# Patient Record
Sex: FEMALE | Race: WHITE | NOT HISPANIC OR LATINO | Employment: OTHER | URBAN - METROPOLITAN AREA
[De-identification: names, ages, dates, MRNs, and addresses within clinical notes are randomized per-mention and may not be internally consistent; named-entity substitution may affect disease eponyms.]

---

## 2021-01-13 ENCOUNTER — OFFICE VISIT (OUTPATIENT)
Dept: URGENT CARE | Facility: URGENT CARE | Age: 66
End: 2021-01-13
Payer: COMMERCIAL

## 2021-01-13 ENCOUNTER — ANCILLARY PROCEDURE (OUTPATIENT)
Dept: GENERAL RADIOLOGY | Facility: CLINIC | Age: 66
End: 2021-01-13
Attending: INTERNAL MEDICINE
Payer: COMMERCIAL

## 2021-01-13 VITALS
HEIGHT: 63 IN | RESPIRATION RATE: 14 BRPM | DIASTOLIC BLOOD PRESSURE: 86 MMHG | TEMPERATURE: 98.9 F | OXYGEN SATURATION: 98 % | WEIGHT: 105 LBS | BODY MASS INDEX: 18.61 KG/M2 | SYSTOLIC BLOOD PRESSURE: 140 MMHG | HEART RATE: 69 BPM

## 2021-01-13 DIAGNOSIS — H02.9 LESION OF LEFT LOWER EYELID: Primary | ICD-10-CM

## 2021-01-13 DIAGNOSIS — M54.50 ACUTE BILATERAL LOW BACK PAIN WITHOUT SCIATICA: ICD-10-CM

## 2021-01-13 DIAGNOSIS — M41.9 SCOLIOSIS, UNSPECIFIED SCOLIOSIS TYPE, UNSPECIFIED SPINAL REGION: ICD-10-CM

## 2021-01-13 DIAGNOSIS — R32 URINARY INCONTINENCE, UNSPECIFIED TYPE: ICD-10-CM

## 2021-01-13 DIAGNOSIS — K59.00 CONSTIPATION, UNSPECIFIED CONSTIPATION TYPE: ICD-10-CM

## 2021-01-13 LAB
ALBUMIN UR-MCNC: NEGATIVE MG/DL
APPEARANCE UR: CLEAR
BILIRUB UR QL STRIP: NEGATIVE
COLOR UR AUTO: YELLOW
GLUCOSE UR STRIP-MCNC: NEGATIVE MG/DL
HGB UR QL STRIP: NEGATIVE
KETONES UR STRIP-MCNC: NEGATIVE MG/DL
LEUKOCYTE ESTERASE UR QL STRIP: NEGATIVE
NITRATE UR QL: NEGATIVE
PH UR STRIP: 5 PH (ref 5–7)
SOURCE: NORMAL
SP GR UR STRIP: 1.02 (ref 1–1.03)
UROBILINOGEN UR STRIP-ACNC: 0.2 EU/DL (ref 0.2–1)

## 2021-01-13 PROCEDURE — 99204 OFFICE O/P NEW MOD 45 MIN: CPT | Performed by: INTERNAL MEDICINE

## 2021-01-13 PROCEDURE — 72100 X-RAY EXAM L-S SPINE 2/3 VWS: CPT | Performed by: RADIOLOGY

## 2021-01-13 PROCEDURE — 81003 URINALYSIS AUTO W/O SCOPE: CPT | Performed by: INTERNAL MEDICINE

## 2021-01-13 RX ORDER — POLYETHYLENE GLYCOL 3350 17 G/17G
1 POWDER, FOR SOLUTION ORAL DAILY
Qty: 500 G | Refills: 0 | Status: SHIPPED | OUTPATIENT
Start: 2021-01-13 | End: 2021-01-20

## 2021-01-13 ASSESSMENT — ENCOUNTER SYMPTOMS
HEMATURIA: 0
NAUSEA: 0
ABDOMINAL PAIN: 0
CONSTIPATION: 1
COUGH: 0
VOMITING: 0
SHORTNESS OF BREATH: 0
NUMBNESS: 0
SPEECH DIFFICULTY: 0
HEADACHES: 0
FATIGUE: 0
DYSURIA: 0
WEAKNESS: 0
BACK PAIN: 1
UNEXPECTED WEIGHT CHANGE: 0

## 2021-01-13 ASSESSMENT — MIFFLIN-ST. JEOR: SCORE: 985.41

## 2021-01-13 NOTE — Clinical Note
Hi.  You will be seeing Lore next week in follow-up to establish care.  She has large lower eyelid skin cancer and some other symptoms that she needs to follow-up with

## 2021-01-13 NOTE — PATIENT INSTRUCTIONS
Dermatology appointment [facility] is trying to arrange for biopsy of left lower eyelid skin lesion and from there can direct care for treatment .      X-ray of low back spine shows scoliosis.  No evidence of cancer lesions on x-ray.        consider physical therapy For your low back pain from scoliosis  I would avoid hot heating pads as you have evidence of skin damage from this.    Urine test is normal.  No evidence of infection to cause early morning incontinence.  Further evaluation may be warranted     Start MiraLAX 1 scoop daily in water for constipation symptoms    I would like you to establish primary care   DR Hernandez next week.  Wednesday       Component      Latest Ref Rng & Units 1/13/2021   Color Urine       Yellow   Appearance Urine       Clear   Glucose Urine      NEG:Negative mg/dL Negative   Bilirubin Urine      NEG:Negative Negative   Ketones Urine      NEG:Negative mg/dL Negative   Specific Gravity Urine      1.003 - 1.035 1.025   Blood Urine      NEG:Negative Negative   pH Urine      5.0 - 7.0 pH 5.0   Protein Albumin Urine      NEG:Negative mg/dL Negative   Urobilinogen Urine      0.2 - 1.0 EU/dL 0.2   Nitrite Urine      NEG:Negative Negative   Leukocyte Esterase Urine      NEG:Negative Negative   Source       Midstream Urine

## 2021-01-13 NOTE — PROGRESS NOTES
ASSESSMENT:      ICD-10-CM    1. Lesion of left lower eyelid  H02.9    2. Constipation, unspecified constipation type  K59.00 polyethylene glycol (MIRALAX) 17 GM/Dose powder   3. Urinary incontinence, unspecified type  R32 *UA reflex to Microscopic and Culture ([place] [facility] (except [place] and [place])   4. Acute bilateral low back pain without sciatica  M54.5 XR Lumbar Spine 2/3 Views   5. Scoliosis, unspecified scoliosis type, unspecified spinal region  M41.9         Medical Decision Makin-year-old female without significant medical history nor primary care clinic for the past many years who presents today with concerns of left lower eye lesion that has been growing slowly over a year.  Suspicious for basal cell/squamous cell or other lesion  Patient understands concern for cancer and [facility] dermatology was contacted today to help assist with her care with planned biopsy and decision of treatment plan. And imaging  Also arranging follow-up appointment here at this clinic to establish primary care provider    She also is complaining of some bowel and urinary symptoms with low back pain.  Potential concern also for metastatic disease from cancer  For her back pain, x-ray of low back pain performed to screen for lytic lesions that could cause back pain.  She also had some urinary incontinence and difficulty with constipation.  Neuro exam of lower extremities and perineum seemed intact.  Urinalysis also performed to look for urine infection  And will initiate treatment of constipation with MiraLAX    PLAN:    Dermatology [facility] contacted. currently they do not have appointments available and will contact her soon to get her in for evaluation and treatment of concerning lower eyelid lesion/cancer    [id number]}    Followup:    Dermatology -appointment to be scheduled  Dr Hernandez -next week with establish care    Patient Instructions     Dermatology appointment  [facility] is trying to arrange for biopsy of left lower eyelid skin lesion and from there can direct care for treatment .      X-ray of low back spine shows scoliosis.  No evidence of cancer lesions on x-ray.        consider physical therapy For your low back pain from scoliosis  I would avoid hot heating pads as you have evidence of skin damage from this.    Urine test is normal.  No evidence of infection to cause early morning incontinence.  Further evaluation may be warranted     Start MiraLAX 1 scoop daily in water for constipation symptoms    I would like you to establish primary care   DR Hernandez next week.  Wednesday       Component      Latest Ref Rng & Units 1/13/2021   Color Urine       Yellow   Appearance Urine       Clear   Glucose Urine      NEG:Negative mg/dL Negative   Bilirubin Urine      NEG:Negative Negative   Ketones Urine      NEG:Negative mg/dL Negative   Specific Gravity Urine      1.003 - 1.035 1.025   Blood Urine      NEG:Negative Negative   pH Urine      5.0 - 7.0 pH 5.0   Protein Albumin Urine      NEG:Negative mg/dL Negative   Urobilinogen Urine      0.2 - 1.0 EU/dL 0.2   Nitrite Urine      NEG:Negative Negative   Leukocyte Esterase Urine      NEG:Negative Negative   Source       Midstream Urine               SUBJECTIVE:   Lore Oleary is a 65 year old female presenting with a chief complaint of   Chief Complaint   Patient presents with     Urgent Care     Back Pain     neck and back hurt this week.      Derm Problem     sore under left eye for 6 months or so.          She is a new patient of [facility].    Onset of symptoms was at least 1 year ago.  Course of illness is worsening.      Location: left lower eye lid  Context: developed sore  Started as a spot, then worse  Current and Associated symptoms: itching, redness, warmth, swelling, pain and drainage - develops scab  Treatment measures tried include: peroxide  Relief from treatment: none, lotions  Significant past  "medical history:       Onset of symptoms was 1 week(s) ago.  Location: bilateral middle low back pain  Context:      No  Injury    Current and Associated symptoms:   Has constipation  Urinary incontinence in the morning.  Therapies to improve symptoms include: heat      Review of Systems   Constitutional: Negative for fatigue and unexpected weight change.   HENT: Negative.    Respiratory: Negative for cough and shortness of breath.    Cardiovascular: Negative for chest pain.   Gastrointestinal: Positive for constipation (BM every few days.  firm). Negative for abdominal pain, nausea and vomiting.   Genitourinary: Negative for dysuria and hematuria.   Musculoskeletal: Positive for back pain.   Neurological: Negative for speech difficulty, weakness, numbness and headaches.       Past Medical History:   Diagnosis Date     Patient denies medical problems      Family History   Problem Relation Age of Onset     Heart Disease Father      Diabetes Sister      Current Outpatient Medications   Medication Sig Dispense Refill     polyethylene glycol (MIRALAX) 17 GM/Dose powder Take 17 g (1 capful) by mouth daily 500 g 0     Social History     Tobacco Use     Smoking status: Never Smoker     Smokeless tobacco: Never Used   Substance Use Topics     Alcohol use: Not on file       OBJECTIVE  BP (!) 140/86   Pulse 69   Temp 98.9  F (37.2  C) (Oral)   Resp 14   Ht 1.6 m (5' 3\")   Wt 47.6 kg (105 lb)   SpO2 98%   BMI 18.60 kg/m      Physical Exam  Vitals signs reviewed.   Constitutional:       General: She is not in acute distress.     Appearance: Normal appearance. She is not ill-appearing.   Eyes:      Comments: left lower eye lid lesion -   3 x 1.5 cm ulcerative lesion     Cardiovascular:      Rate and Rhythm: Normal rate and regular rhythm.      Pulses: Normal pulses.      Heart sounds: Normal heart sounds.   Pulmonary:      Effort: Pulmonary effort is normal.      Breath sounds: Normal breath sounds.   Abdominal:      " Palpations: Abdomen is soft.      Tenderness: There is no abdominal tenderness.   Genitourinary:     Comments: Good anal strength  Musculoskeletal:      Right lower leg: No edema.      Left lower leg: No edema.      Comments: BACK: Lumbosacral spine area reveals local tenderness. no Painful and reduced LS ROM noted. Straight leg raise is negative  at bilateral  . motor strength and sensation normal, including heel and toe gait are normal       No abdominal tenderness, mass or organomegaly.     Skin:     Findings: No rash.      Comments: Postinflammatory hyperpigmented of  lower back where patient had hot heating pads   Neurological:      General: No focal deficit present.      Mental Status: She is alert.      Sensory: No sensory deficit.      Motor: No weakness.      Comments: normal strength lower extremities  normal perianal sensation to broken tongue depressor   Psychiatric:         Mood and Affect: Mood normal.               Labs:  Results for orders placed or performed in visit on 01/13/21 (from the past 24 hour(s))   *UA reflex to Microscopic and Culture ([place] [facility] (except [place] and [place])    Specimen: Midstream Urine   Result Value Ref Range    Color Urine Yellow     Appearance Urine Clear     Glucose Urine Negative NEG^Negative mg/dL    Bilirubin Urine Negative NEG^Negative    Ketones Urine Negative NEG^Negative mg/dL    Specific Gravity Urine 1.025 1.003 - 1.035    Blood Urine Negative NEG^Negative    pH Urine 5.0 5.0 - 7.0 pH    Protein Albumin Urine Negative NEG^Negative mg/dL    Urobilinogen Urine 0.2 0.2 - 1.0 EU/dL    Nitrite Urine Negative NEG^Negative    Leukocyte Esterase Urine Negative NEG^Negative    Source Midstream Urine        X-Ray was done, my findings are: Scoliosis noted of lumbar spine.  Otherwise no compression fractures or lytic lesions noted.      I spent over 60  minutes with care of  Lore Oleary during today's office visit.  Visit was competed by 3  pm

## 2021-01-17 ENCOUNTER — DOCUMENTATION ONLY (OUTPATIENT)
Dept: CARE COORDINATION | Facility: CLINIC | Age: 66
End: 2021-01-17

## 2021-01-20 ENCOUNTER — OFFICE VISIT (OUTPATIENT)
Dept: FAMILY MEDICINE | Facility: CLINIC | Age: 66
End: 2021-01-20
Payer: COMMERCIAL

## 2021-01-20 VITALS
OXYGEN SATURATION: 98 % | DIASTOLIC BLOOD PRESSURE: 76 MMHG | TEMPERATURE: 97.4 F | WEIGHT: 105 LBS | SYSTOLIC BLOOD PRESSURE: 122 MMHG | HEART RATE: 77 BPM | BODY MASS INDEX: 18.6 KG/M2

## 2021-01-20 DIAGNOSIS — Z13.220 SCREENING FOR LIPID DISORDERS: ICD-10-CM

## 2021-01-20 DIAGNOSIS — E83.52 HYPERCALCEMIA: ICD-10-CM

## 2021-01-20 DIAGNOSIS — L98.9 SKIN LESION: Primary | ICD-10-CM

## 2021-01-20 DIAGNOSIS — Z23 NEED FOR PROPHYLACTIC VACCINATION AND INOCULATION AGAINST INFLUENZA: ICD-10-CM

## 2021-01-20 DIAGNOSIS — Z13.1 SCREENING FOR DIABETES MELLITUS: ICD-10-CM

## 2021-01-20 DIAGNOSIS — Z11.59 ENCOUNTER FOR HEPATITIS C SCREENING TEST FOR LOW RISK PATIENT: ICD-10-CM

## 2021-01-20 DIAGNOSIS — R53.83 FATIGUE, UNSPECIFIED TYPE: ICD-10-CM

## 2021-01-20 LAB
ALBUMIN SERPL-MCNC: 4 G/DL (ref 3.4–5)
ALP SERPL-CCNC: 54 U/L (ref 40–150)
ALT SERPL W P-5'-P-CCNC: 23 U/L (ref 0–50)
ANION GAP SERPL CALCULATED.3IONS-SCNC: 7 MMOL/L (ref 3–14)
AST SERPL W P-5'-P-CCNC: 19 U/L (ref 0–45)
BILIRUB SERPL-MCNC: 0.3 MG/DL (ref 0.2–1.3)
BUN SERPL-MCNC: 18 MG/DL (ref 7–30)
CALCIUM SERPL-MCNC: 11.9 MG/DL (ref 8.5–10.1)
CHLORIDE SERPL-SCNC: 108 MMOL/L (ref 94–109)
CHOLEST SERPL-MCNC: 283 MG/DL
CO2 SERPL-SCNC: 25 MMOL/L (ref 20–32)
CREAT SERPL-MCNC: 0.69 MG/DL (ref 0.52–1.04)
ERYTHROCYTE [DISTWIDTH] IN BLOOD BY AUTOMATED COUNT: 12 % (ref 10–15)
GFR SERPL CREATININE-BSD FRML MDRD: >90 ML/MIN/{1.73_M2}
GLUCOSE SERPL-MCNC: 96 MG/DL (ref 70–99)
HCT VFR BLD AUTO: 38.4 % (ref 35–47)
HDLC SERPL-MCNC: 100 MG/DL
HGB BLD-MCNC: 12.6 G/DL (ref 11.7–15.7)
LDLC SERPL CALC-MCNC: 168 MG/DL
MCH RBC QN AUTO: 32.3 PG (ref 26.5–33)
MCHC RBC AUTO-ENTMCNC: 32.8 G/DL (ref 31.5–36.5)
MCV RBC AUTO: 99 FL (ref 78–100)
NONHDLC SERPL-MCNC: 183 MG/DL
PLATELET # BLD AUTO: 341 10E9/L (ref 150–450)
POTASSIUM SERPL-SCNC: 4.4 MMOL/L (ref 3.4–5.3)
PROT SERPL-MCNC: 8 G/DL (ref 6.8–8.8)
RBC # BLD AUTO: 3.9 10E12/L (ref 3.8–5.2)
SODIUM SERPL-SCNC: 140 MMOL/L (ref 133–144)
TRIGL SERPL-MCNC: 76 MG/DL
WBC # BLD AUTO: 4.6 10E9/L (ref 4–11)

## 2021-01-20 PROCEDURE — G0009 ADMIN PNEUMOCOCCAL VACCINE: HCPCS | Performed by: STUDENT IN AN ORGANIZED HEALTH CARE EDUCATION/TRAINING PROGRAM

## 2021-01-20 PROCEDURE — 80053 COMPREHEN METABOLIC PANEL: CPT | Performed by: STUDENT IN AN ORGANIZED HEALTH CARE EDUCATION/TRAINING PROGRAM

## 2021-01-20 PROCEDURE — 80061 LIPID PANEL: CPT | Performed by: STUDENT IN AN ORGANIZED HEALTH CARE EDUCATION/TRAINING PROGRAM

## 2021-01-20 PROCEDURE — 99214 OFFICE O/P EST MOD 30 MIN: CPT | Mod: 25 | Performed by: STUDENT IN AN ORGANIZED HEALTH CARE EDUCATION/TRAINING PROGRAM

## 2021-01-20 PROCEDURE — G0008 ADMIN INFLUENZA VIRUS VAC: HCPCS | Performed by: STUDENT IN AN ORGANIZED HEALTH CARE EDUCATION/TRAINING PROGRAM

## 2021-01-20 PROCEDURE — 90662 IIV NO PRSV INCREASED AG IM: CPT | Performed by: STUDENT IN AN ORGANIZED HEALTH CARE EDUCATION/TRAINING PROGRAM

## 2021-01-20 PROCEDURE — 36415 COLL VENOUS BLD VENIPUNCTURE: CPT | Performed by: STUDENT IN AN ORGANIZED HEALTH CARE EDUCATION/TRAINING PROGRAM

## 2021-01-20 PROCEDURE — 85027 COMPLETE CBC AUTOMATED: CPT | Performed by: STUDENT IN AN ORGANIZED HEALTH CARE EDUCATION/TRAINING PROGRAM

## 2021-01-20 PROCEDURE — 90732 PPSV23 VACC 2 YRS+ SUBQ/IM: CPT | Performed by: STUDENT IN AN ORGANIZED HEALTH CARE EDUCATION/TRAINING PROGRAM

## 2021-01-20 ASSESSMENT — PATIENT HEALTH QUESTIONNAIRE - PHQ9: SUM OF ALL RESPONSES TO PHQ QUESTIONS 1-9: 16

## 2021-01-20 NOTE — LETTER
January 21, 2021      Lore Arun Mamta  [address]  [facility] [address]        Dear ,    We are writing to inform you of your test results.    Calcium is high as we have discussed on the phone. Please follow up as directed.    Cholesterol is also high. I recommend dietary changes and regular exercise (at least 150 minutes per week of aerobic and strength training). See dietary recommendations below:    Foods to eat and what to avoid/minimize when you have diabetes or heart disease (or want to prevent it):      Foods to avoid:  -Processed foods (eg. frozen meals, pre packaged foods)  -Sucrose and fructose (check the ingredients)  -Refined carbohydrates (eg. white flour bread and crackers, pasta)  -Fast food  -Too much animal fat (red meat, excessive butter or dairy)  -Food or drink in plastic containers  -Soda, fruit juice, sweetened beverages  -[place] large meals  -High sugar fruits (eg. dimitry, pineapple, watermelon, grapes, cherries, bananas)    Foods to eat:  -Unlimited colorful vegetables  -Unlimited leafy greens  -Polyunsaturated and monounsaturated fats (see below)  -Olive oil  -Lean meat (turkey, chicken)  -Fish and seafood (eg. Sardines, salmon, mackerel)  -Whole grains (eg. Quinoa, oatmeal, buckwheat, brown rice, barley, rye, spelt)  -Avocados  -Unsalted nuts and seeds, raw when possible  -Eggs in moderation  -Berries, audi, limes  -Cinnamon   -Drink water, herbal tea, green tea    Healthiest sources of fats: olives, olive oil, avocado and avocado oil, canola oil, almonds, cashews, hazelnuts, macadamia nuts, peanuts, pecans, pistachios, flax seeds...    Tips: Cook with avocado oil. Add olive oil AFTER you cook foods to preserve the nutrients. When cooking a meal, make extra to eat the next day. Plan your meals ahead of time.       Resulted Orders   Lipid panel reflex to direct LDL Fasting   Result Value Ref Range    Cholesterol 283 (H) <200 mg/dL      Comment:      Desirable:       <200  mg/dl    Triglycerides 76 <150 mg/dL      Comment:      Fasting specimen    HDL Cholesterol 100 >49 mg/dL    LDL Cholesterol Calculated 168 (H) <100 mg/dL      Comment:      Above desirable:  100-129 mg/dl  Borderline High:  130-159 mg/dL  High:             160-189 mg/dL  Very high:       >189 mg/dl      Non HDL Cholesterol 183 (H) <130 mg/dL      Comment:      Above Desirable:  130-159 mg/dl  Borderline high:  160-189 mg/dl  High:             190-219 mg/dl  Very high:       >219 mg/dl     Comprehensive metabolic panel (BMP + Alb, Alk Phos, ALT, AST, Total. Bili, TP)   Result Value Ref Range    Sodium 140 133 - 144 mmol/L    Potassium 4.4 3.4 - 5.3 mmol/L    Chloride 108 94 - 109 mmol/L    Carbon Dioxide 25 20 - 32 mmol/L    Anion Gap 7 3 - 14 mmol/L    Glucose 96 70 - 99 mg/dL      Comment:      Fasting specimen    Urea Nitrogen 18 7 - 30 mg/dL    Creatinine 0.69 0.52 - 1.04 mg/dL    GFR Estimate >90 >60 mL/min/[1.73_m2]      Comment:      Non  GFR Calc  Starting 12/18/2018, serum creatinine based estimated GFR (eGFR) will be   calculated using the Chronic Kidney Disease Epidemiology Collaboration   (CKD-EPI) equation.      GFR Estimate If Black >90 >60 mL/min/[1.73_m2]      Comment:       GFR Calc  Starting 12/18/2018, serum creatinine based estimated GFR (eGFR) will be   calculated using the Chronic Kidney Disease Epidemiology Collaboration   (CKD-EPI) equation.      Calcium 11.9 (H) 8.5 - 10.1 mg/dL    Bilirubin Total 0.3 0.2 - 1.3 mg/dL    Albumin 4.0 3.4 - 5.0 g/dL    Protein Total 8.0 6.8 - 8.8 g/dL    Alkaline Phosphatase 54 40 - 150 U/L    ALT 23 0 - 50 U/L    AST 19 0 - 45 U/L   CBC with platelets   Result Value Ref Range    WBC 4.6 4.0 - 11.0 10e9/L    RBC Count 3.90 3.8 - 5.2 10e12/L    Hemoglobin 12.6 11.7 - 15.7 g/dL    Hematocrit 38.4 35.0 - 47.0 %    MCV 99 78 - 100 fl    MCH 32.3 26.5 - 33.0 pg    MCHC 32.8 31.5 - 36.5 g/dL    RDW 12.0 10.0 - 15.0 %    Platelet Count  341 419 - 578 10e9/L       If you have any questions or concerns, please call the clinic at the number listed above.       Sincerely,      Yenni Hernandez MD

## 2021-01-20 NOTE — PROGRESS NOTES
Assessment & Plan     1. Skin lesion  Follow up scheduled with Dermatology this Friday. Suspect BCC or squamous cell cancer.     2. Encounter for hepatitis C screening test for low risk patient  Due for screening.  - **Hepatitis C Screen Reflex to RNA FUTURE anytime; Future    3. Screening for lipid disorders  Has not been screened previously  - Lipid panel reflex to direct LDL Fasting    4. Screening for diabetes mellitus  Has not been screened.  - Comprehensive metabolic panel (BMP + Alb, Alk Phos, ALT, AST, Total. Bili, TP)    5. Fatigue, unspecified type  Notes feeling a little down due to COVID, isolation from friends, skin ulceration but declines any additional assistance today.  - Comprehensive metabolic panel (BMP + Alb, Alk Phos, ALT, AST, Total. Bili, TP)  - CBC with platelets    6. Need for prophylactic vaccination and inoculation against influenza  Due for vaccine.   - FLUZONE HIGH DOSE 65+  [73156]    30 minutes spent on the date of the encounter doing chart review, history and exam, documentation and further activities as noted above         Depression Screening Follow Up    PHQ 1/20/2021   PHQ-9 Total Score 16   Q9: Thoughts of better off dead/self-harm past 2 weeks Not at all     Follow Up Actions Taken  Patient counseled, no additional follow up at this time.     Follow up: See patient instructions.    Yenni Hernandez MD  [facility]    Grey Torrez is a 65 year old who presents to clinic today for the following health issues     HPI     She is here to follow up after recent  visit for eye lesion:    Eye lesion - present for about a year. Put off seeking care due to lack of medical insurance. Hasn't noted any change in lesion since her  visit. Denies fever, chill, night sweats, weight loss, redness, drainage. She has some pain at night that makes it difficult to sleep. Has an appointment with Dermatology on Friday. Her sister will be taking her.    She had  been having urinary incontinence, constipation at her  visit. These symptoms have improved and no longer bother her. She believes she was nervous at the time. She had also had back pain that has now resolved. Denies any new concerns today.    She has not had a PCP or physical. She is open to lab testing today. She will follow up in a month or so  for a physical and elects to defer routine screens until then.     She grew up in [place]. She lives with a roommate. They get along OK. She is not working currently. 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 She enjoys going for walks. She is a little less energetic and a little down as of late due to COVID, winter time and isolation. She denies any significant concern about this or any need for therapy, medication. She feels she is managing fine.     Objective    /76 (BP Location: Left arm, Patient Position: Sitting, Cuff Size: Adult Regular)   Pulse 77   Temp 97.4  F (36.3  C) (Oral)   Wt 47.6 kg (105 lb)   SpO2 98%   BMI 18.60 kg/m    Body mass index is 18.6 kg/m .  Physical Exam   GENERAL: healthy, alert and no distress  EYES: Eyes grossly normal to inspection, PERRL and conjunctivae and sclerae normal  HENT: ear canals and TM's normal, nose and mouth without ulcers or lesions, large ulcerated lesion inferior to her left eye covered with bandaid (see  note from 1/3/2021 for photo)  RESP: lungs clear to auscultation - no rales, rhonchi or wheezes  CV: regular rate and rhythm, normal S1 S2, no S3 or S4, no murmur, click or rub, no peripheral edema and peripheral pulses strong  MS: no gross musculoskeletal defects noted, no edema  NEURO: Normal strength and tone, mentation intact and speech normal  PSYCH: mentation appears normal, affect normal    Labs pending.

## 2021-01-20 NOTE — PATIENT INSTRUCTIONS
[facility]    Address   [address] [place] [address]     Labs - if anything abnormal, we will call you. Otherwise expect a letter in the mail.    Vitamin D - Recommend 2000 international unit(s) per day.     Follow up for a physical in 1-2 months when you are ready.

## 2021-01-21 ENCOUNTER — TELEPHONE (OUTPATIENT)
Dept: FAMILY MEDICINE | Facility: CLINIC | Age: 66
End: 2021-01-21

## 2021-01-21 PROBLEM — R79.89 HIGH SERUM LOW-DENSITY LIPOPROTEIN (LDL): Status: ACTIVE | Noted: 2021-01-21

## 2021-01-21 PROBLEM — E83.52 HIGH CALCIUM LEVELS: Status: ACTIVE | Noted: 2021-01-21

## 2021-01-21 NOTE — RESULT ENCOUNTER NOTE
Please call patient:    Her calcium level is high. I would like to obtain repeat labs to make sure that this is not a lab error. High calcium should be looked into further because it can be a sign of thyroid disease or another condition. We can either recheck her labs if she would schedule a physical in 2 weeks or she can come in for a lab only appointment, which can be done within 1-2 weeks.     Additionally, cholesterol is high. I will send her a results letter with further instructions regarding this (lifestyle changes).    Thanks. Will place lab orders.    Yenni Hernandez MD

## 2021-01-21 NOTE — TELEPHONE ENCOUNTER
----- Message from Yenni Hernandez MD sent at 1/21/2021  7:26 AM CST -----  Please call patient:    Her calcium level is high. I would like to obtain repeat labs to make sure that this is not a lab error. High calcium should be looked into further because it can be a sign of thyroid disease or another condition. We can either recheck her labs if she would schedule a physical in 2 weeks or she can come in for a lab only appointment, which can be done within 1-2 weeks.     Additionally, cholesterol is high. I will send her a results letter with further instructions regarding this (lifestyle changes).    Thanks. Will place lab orders.    Yenni Hernandez MD

## 2021-01-21 NOTE — TELEPHONE ENCOUNTER
Writer called patient and reviewed message per Dr. Hernandez.    Patient verbalized understanding and in agreement with plan.    Patient stated she would prefer lab-only visit.    Lab appt scheduled on 1/31/21.  Appt date, time and location confirmed with patient.  Patient informed she does not need to fast for these labs.    DALILA CookN, RN  [facility]

## 2021-01-22 ENCOUNTER — OFFICE VISIT (OUTPATIENT)
Dept: DERMATOLOGY | Facility: CLINIC | Age: 66
End: 2021-01-22
Payer: COMMERCIAL

## 2021-01-22 DIAGNOSIS — D48.9 NEOPLASM OF UNCERTAIN BEHAVIOR: Primary | ICD-10-CM

## 2021-01-22 PROCEDURE — 11102 TANGNTL BX SKIN SINGLE LES: CPT | Mod: GC | Performed by: DERMATOLOGY

## 2021-01-22 PROCEDURE — 88305 TISSUE EXAM BY PATHOLOGIST: CPT | Performed by: DERMATOLOGY

## 2021-01-22 PROCEDURE — 99203 OFFICE O/P NEW LOW 30 MIN: CPT | Mod: 25 | Performed by: DERMATOLOGY

## 2021-01-22 RX ORDER — LIDOCAINE HYDROCHLORIDE AND EPINEPHRINE 10; 10 MG/ML; UG/ML
3 INJECTION, SOLUTION INFILTRATION; PERINEURAL ONCE
Status: ACTIVE | OUTPATIENT
Start: 2021-01-22

## 2021-01-22 ASSESSMENT — PAIN SCALES - GENERAL
PAINLEVEL: MODERATE PAIN (5)
PAINLEVEL: NO PAIN (0)

## 2021-01-22 NOTE — LETTER
1/22/2021       RE: Lore Oleary  [address]  [facility] [address]     Dear Colleague,    Thank you for referring your patient, Lore Oleary, to the [facility] [place] at [facility]. Please see a copy of my visit note below.    [facility] Note  Encounter Date: Jan 22, 2021  Office Visit     Dermatology Problem List:  1. NUB   - L lower eyelid/upper cheek with possible intraorbital extension, r/o BCC s/p bx 1/22/21 results pending   - Mohs referral pending for further evaluation of imaging to r/o intraortibal invovlement    Social history: Lives with a roommate in [facility].  Family history: Negative for skin cancer.  ____________________________________________    Assessment & Plan:     1. NUB   - Pink pearly plaque involving nearly the entire L lower eyelid extending to the upper cheek with obliteration of lower lashes, r/o BCC s/p bx 1/22/21 results pending   EOM grossly intact today w/out evidence of LAD. However, given extensive involvement of lower lid, will need to further evaluate need for MRI of the orbit to r/o EOM invasion.   - Mohs referral pending for further evaluation of imaging to r/o ocular muscle invovlement  - Discussed in detail with patient today our concerns. Explained this is almost certainly a BCC. While these rarely result in death, they can be locally advanced. Discussed my concerns about involvement of the orbit. Explained plan to biopsy today to confirm diagnosis and then get in with one of our mohs surgeons for evaluation and consideration of imaging to help aid in decision of surgical approach vs vismodegib.    Procedures Performed:   - Shave biopsy procedure note, location L lower eyelid/upper cheek: After discussion of benefits and risks including but not limited to bleeding, infection, scar, incomplete removal, recurrence, and non-diagnostic biopsy, written  consent and photographs were obtained. The area was cleaned with isopropyl alcohol. 0.5mL of 1% lidocaine with epinephrine was injected to obtain adequate anesthesia of  lesion(s) . Snip biopsy at site(s) performed. Hemostasis was achieved with cautery. Petrolatum ointment and a sterile dressing were applied. The patient tolerated the procedure and no complications were noted. The patient was provided with verbal and written post care instructions.     Follow-up: with Dr. De La Rosa for consult.     Staff and Resident:     Smita Rios MD/MPH  Internal Medicine/Dermatology  PGY-4    Staff Physician Comments:   I saw and evaluated the patient with the resident and I agree with the assessment and plan. I was present for the key portions of the above major procedure and examination.    Anne Santa MD    Department of Dermatology  [facility]: Phone: [phone], Fax:[phone]  [facility]: Phone: [phone], Fax: [phone]      ____________________________________________    CC: Derm Problem (Patient concern regarding redness and swelling to lower left eyelid. )    HPI:  Ms. Lore Oleary is a(n) 65 year old female who presents today as a new patient for a lesion on the L lowerlid and upper cheek. It's been there for a year and is getting worse and growing and is now painful. No personal hx of skin cancer. No family of skin cancer that she knows of. She reports her own health could be better. She hasn't had health insurance for many years and just recently got Medicare this Fall and has established care with a primary doctor recently.       Patient is otherwise feeling well, without additional concerns.    Labs:  None.     Physical Exam:  Vitals: There were no vitals taken for this visit.  SKIN: Focused examination of face was performed.  - Pink pearly plaque involving nearly the  entire thickness of L lower eyelid (cutaneous to mucosa) extending to the upper cheek with obliteration of lower lashes  - Extraocular muscle movement grossly intact   - No cervical, submandibular, occipital or pre/post auricular lymphadenopathy.   - No other lesions of concern on areas examined.     Medications:  No current outpatient medications on file.     Current Facility-Administered Medications   Medication     lidocaine 1% with EPINEPHrine 1:100,000 injection 3 mL      Past Medical History:   Patient Active Problem List   Diagnosis     High calcium levels     High serum low-density lipoprotein (LDL)     Past Medical History:   Diagnosis Date     Patient denies medical problems      [facility]  [address] [place] [address] on close of this encounter.

## 2021-01-22 NOTE — NURSING NOTE
Dermatology Rooming Note    Lore Oleary's goals for this visit include:   Chief Complaint   Patient presents with     Derm Problem     Patient concern regarding redness and swelling to lower left eyelid.      Bacilio Lopez, EMT

## 2021-01-22 NOTE — PATIENT INSTRUCTIONS

## 2021-01-22 NOTE — PROGRESS NOTES
[facility] Dermatology Note  Encounter Date: Jan 22, 2021  Office Visit     Dermatology Problem List:  1. NUB   - L lower eyelid/upper cheek with possible intraorbital extension, r/o BCC s/p bx 1/22/21 results pending   - Mohs referral pending for further evaluation of imaging to r/o intraortibal invovlement    Social history: Lives with a roommate in [facility].  Family history: Negative for skin cancer.  ____________________________________________    Assessment & Plan:     1. NUB   - Pink pearly plaque involving nearly the entire L lower eyelid extending to the upper cheek with obliteration of lower lashes, r/o BCC s/p bx 1/22/21 results pending   EOM grossly intact today w/out evidence of LAD. However, given extensive involvement of lower lid, will need to further evaluate need for MRI of the orbit to r/o EOM invasion.   - Mohs referral pending for further evaluation of imaging to r/o ocular muscle invovlement  - Discussed in detail with patient today our concerns. Explained this is almost certainly a BCC. While these rarely result in death, they can be locally advanced. Discussed my concerns about involvement of the orbit. Explained plan to biopsy today to confirm diagnosis and then get in with one of our mohs surgeons for evaluation and consideration of imaging to help aid in decision of surgical approach vs vismodegib.    Procedures Performed:   - Shave biopsy procedure note, location L lower eyelid/upper cheek: After discussion of benefits and risks including but not limited to bleeding, infection, scar, incomplete removal, recurrence, and non-diagnostic biopsy, written consent and photographs were obtained. The area was cleaned with isopropyl alcohol. 0.5mL of 1% lidocaine with epinephrine was injected to obtain adequate anesthesia of  lesion(s) . Snip biopsy at site(s) performed. Hemostasis was achieved with cautery. Petrolatum ointment and a sterile dressing were applied. The  patient tolerated the procedure and no complications were noted. The patient was provided with verbal and written post care instructions.     Follow-up: with Dr. De La Rosa for consult.     Staff and Resident:     Smita Rios MD/MPH  Internal Medicine/Dermatology  PGY-4    Staff Physician Comments:   I saw and evaluated the patient with the resident and I agree with the assessment and plan. I was present for the key portions of the above major procedure and examination.    Anne Santa MD    Department of Dermatology  [facility]: Phone: [phone], Fax:[phone]  [facility]: Phone: [phone], Fax: [phone]      ____________________________________________    CC: Derm Problem (Patient concern regarding redness and swelling to lower left eyelid. )    HPI:  Ms. Lore Oleary is a(n) 65 year old female who presents today as a new patient for a lesion on the L lowerlid and upper cheek. It's been there for a year and is getting worse and growing and is now painful. No personal hx of skin cancer. No family of skin cancer that she knows of. She reports her own health could be better. She hasn't had health insurance for many years and just recently got Medicare this Fall and has established care with a primary doctor recently.       Patient is otherwise feeling well, without additional concerns.    Labs:  None.     Physical Exam:  Vitals: There were no vitals taken for this visit.  SKIN: Focused examination of face was performed.  - Pink pearly plaque involving nearly the entire thickness of L lower eyelid (cutaneous to mucosa) extending to the upper cheek with obliteration of lower lashes  - Extraocular muscle movement grossly intact   - No cervical, submandibular, occipital or pre/post auricular lymphadenopathy.   - No other lesions of concern on areas examined.      Medications:  No current outpatient medications on file.     Current Facility-Administered Medications   Medication     lidocaine 1% with EPINEPHrine 1:100,000 injection 3 mL      Past Medical History:   Patient Active Problem List   Diagnosis     High calcium levels     High serum low-density lipoprotein (LDL)     Past Medical History:   Diagnosis Date     Patient denies medical problems      [facility]  [address] [place] [address] on close of this encounter.

## 2021-01-22 NOTE — NURSING NOTE
Lidocaine-epinephrine 1-1:791564 % injection   0.5mL once for one use, starting 1/22/2021 ending 1/22/2021,  2mL disp, R-0, injection  Injected by Dr. Damon

## 2021-01-25 LAB — COPATH REPORT: NORMAL

## 2021-01-25 NOTE — TELEPHONE ENCOUNTER
FUTURE VISIT INFORMATION      FUTURE VISIT INFORMATION:    Date: 1.27.21    Time: 1:00    Location: [facility]  REFERRAL INFORMATION:    Referring provider:  Dr. Anne Santa    Referring providers clinic:  [facility]    Reason for visit/diagnosis  in person consult per Dr. Santa, L lower eyelid, pending path from 1/22 appt    RECORDS REQUESTED FROM:       Clinic name Comments Records Status Photos Status   [facility] 1.22.21  Path #  (pending) [facility] 1.20.21  Dr. Yenni Hernandez    1.13.21  Dr. Lara Rueda [facility]

## 2021-01-27 ENCOUNTER — PRE VISIT (OUTPATIENT)
Dept: DERMATOLOGY | Facility: CLINIC | Age: 66
End: 2021-01-27

## 2021-01-27 ENCOUNTER — OFFICE VISIT (OUTPATIENT)
Dept: DERMATOLOGY | Facility: CLINIC | Age: 66
End: 2021-01-27
Payer: COMMERCIAL

## 2021-01-27 DIAGNOSIS — C44.1192 BASAL CELL CARCINOMA (BCC) OF LEFT LOWER EYELID: Primary | ICD-10-CM

## 2021-01-27 PROCEDURE — 99214 OFFICE O/P EST MOD 30 MIN: CPT | Mod: 57 | Performed by: DERMATOLOGY

## 2021-01-27 ASSESSMENT — PAIN SCALES - GENERAL: PAINLEVEL: MODERATE PAIN (5)

## 2021-01-27 NOTE — NURSING NOTE
Chief Complaint   Patient presents with     Derm Problem     patient is here today for a consult BCC left lower eyelid.      Jossy CRUZ CMA

## 2021-01-27 NOTE — LETTER
1/27/2021       RE: Lore Oleary  [address]  [facility] [address]     Dear Colleague,    Thank you for referring your patient, Lore Oleary, to the [facility]ERMAT[facility] [place] at [facility]. Please see a copy of my visit note below.    Dermatology Surgery Note    Dermatology Surgery Clinic  [facility]  [address] [place] [address]    Dermatology Problem List:  (1) BCC, Left lower eyelid, s/p biopsy 1/22/2021     Subjective: Ms. Oleary is a pleasant 65 year old woman with history of basal cell carcinoma of the entire lower eyelid who presents today for surgical consultation. The patient reports the tumor has been growing for over 1 year. She denies any changes in vision, but does report some irritation to the globe of the eye and tenderness in the area. She has not had a skin cancer in the past and is not familiar with Mohs surgery.     The patient denies any other joyce conditions and takes no medications. She denies joint replacement, artificial valves or an implantable device. She has no known allergies.     Overall, the patient is feeling well today    Objective: An exam of the face was performed today   Cranial Nerve exam: the eyes move in all directions equally, pupils are even bilaterally  Skin: Left lower eyelid with large 3 x 2 cm friable, waxy, mobile nodule. The tumor was able to be retracted from the globe and inverted with manipulation.     Assessment and Plan:   (1) Basal cell carcinoma of the left lower eye lid  - Discussed the nature of the diagnosis/condition  - Discussed the treatment options, including the risks benefits and expectations of these options.   - Recommended micrographic surgery, patient agrees to proceed with scheduling  - Plan for cheek advancement flap / island pedicle flap closure for repair of the anterior lamella fo the lower  eyelid and cheek in combination with a Sierra flap to repair posterior lamella.     Patient was discussed with and evaluated by attending physician Dr. Thomas Lara MD  PGY-6    Micrographic Surgery and Dermatologic Oncology Fellow  January 27, 2021                Attestation signed by Thomas De La Rosa MD at 2/4/2021  8:35 AM:    Attending Attestation:  I personally interviewed and examined the patient.  The patient was discussed with the resident.  I reviewed the resident note and agree with the findings.  Any edits are below.    History: BCC On lower eyelid    PE: 3 cm ulcerated tumor.  Mobile on underlying tissue.  The tumor telescopes around the lateral globe but does not attached to it.    A/P: Large BCC of lower eyelid -- schedule Mohs.  Would not opt for immediate stenting due to risk of recurrence and fact that patient is currently asymmptomatic without a functional lower lid canalicular system.  Discussed flap repair and possible need for lid sharing procedure to repair.      Thomas De La Rosa M.D.  Professor  Director of Dermatologic Surgery  Department of Dermatology

## 2021-01-27 NOTE — PATIENT INSTRUCTIONS
Mohs Cutaneous Micrographic Surgery Unit  Dr. Thomas De La Rosa      Pre-Surgical Instruction Sheet    1. Expect to be here majority of the morning.  The Mohs surgical procedure can be an extensive surgery requiring multiple stages. Each stage may take 1-2 hours.    ? You may eat breakfast  ? You may bring snacks or beverages with you  ? Take all normal medications morning of surgery -- unless otherwise indicated by your physician  ? If you have an artificial heart valve, or joint replacement within two years, we will prescribe an antibiotic. Please take one hour prior to surgery.    2. You will need a  to be with you if your surgical site is close to your eyes. You can get swelling/bruising immediately after surgery and will go home with a big bulky bandage that could obstruct your view of driving safely.    3. Wear comfortable clothing -- preferably a button down shirt or a loose fitted shirt. (to avoid pulling over pressure bandage off when you get home) If your surgery site is on your leg, try wearing loose fitted pants. If your surgery site is on your arm, try wearing a short-sleeve shirt.    4. Bring reading material or other items to help pass time. We do have internet access available if you own a laptop, iPad, etc.)    5. Women - do NOT wear make-up. We will be washing it off anyone needing surgery on the face    6. Do NOT stop blood thinning medication unless instructed to do so by your surgeon. This will include: Warfarin, Coumadin, Jantoven, Aspirin, Plavix and Pradaxa. If you are taking Warfarin or Coumadin, please have your INR blood test results sent to our office no more than 7 days prior to your surgery.     7. Tylenol is a good alternative to take for headaches and pain, and can be taken throughout your surgery and postoperative recovery.    8. If your surgery is on your trunk, arms, hands, legs, feet, fingernail or a toenail, please wash the area with Hibiclens, an over-the-counter antiseptic soap,  the night before and morning of your surgery.     If you have any questions, please feel free to contact us.    Phone numbers:  During business hours (M-F 8:00-4:30 p.m.)  [facility]:  [phone] - select option 1 for appt. desk  [phone] - select option 3 for nurse triage line  [facility]:   [phone] - goes straight to call center   ---------------------------------------------------------  Evenings/Weekends/Holidays  Hospital - [phone]   TTY for hearing [phone]  *Ask  to page dermatologist on-call  Emergency [phone]  TTY for hearing impaired- [phone]

## 2021-01-28 NOTE — PROGRESS NOTES
Dermatology Surgery Note    Dermatology Surgery Clinic  [facility]  [address] [place] [address]    Dermatology Problem List:  (1) BCC, Left lower eyelid, s/p biopsy 1/22/2021     Subjective: Ms. Oleary is a pleasant 65 year old woman with history of basal cell carcinoma of the entire lower eyelid who presents today for surgical consultation. The patient reports the tumor has been growing for over 1 year. She denies any changes in vision, but does report some irritation to the globe of the eye and tenderness in the area. She has not had a skin cancer in the past and is not familiar with Mohs surgery.     The patient denies any other joyce conditions and takes no medications. She denies joint replacement, artificial valves or an implantable device. She has no known allergies.     Overall, the patient is feeling well today    Objective: An exam of the face was performed today   Cranial Nerve exam: the eyes move in all directions equally, pupils are even bilaterally  Skin: Left lower eyelid with large 3 x 2 cm friable, waxy, mobile nodule. The tumor was able to be retracted from the globe and inverted with manipulation.     Assessment and Plan:   (1) Basal cell carcinoma of the left lower eye lid  - Discussed the nature of the diagnosis/condition  - Discussed the treatment options, including the risks benefits and expectations of these options.   - Recommended micrographic surgery, patient agrees to proceed with scheduling  - Plan for cheek advancement flap / island pedicle flap closure for repair of the anterior lamella fo the lower eyelid and cheek in combination with a Sierra flap to repair posterior lamella.     Patient was discussed with and evaluated by attending physician Dr. Thomas Lara MD  PGY-6    Micrographic Surgery and Dermatologic Oncology Fellow  January 27, 2021

## 2021-01-31 DIAGNOSIS — E21.0 PRIMARY HYPERPARATHYROIDISM (H): Primary | ICD-10-CM

## 2021-01-31 DIAGNOSIS — Z11.59 ENCOUNTER FOR HEPATITIS C SCREENING TEST FOR LOW RISK PATIENT: ICD-10-CM

## 2021-01-31 DIAGNOSIS — E83.52 HYPERCALCEMIA: ICD-10-CM

## 2021-01-31 LAB
ANION GAP SERPL CALCULATED.3IONS-SCNC: <1 MMOL/L (ref 3–14)
BUN SERPL-MCNC: 15 MG/DL (ref 7–30)
CALCIUM SERPL-MCNC: 12.6 MG/DL (ref 8.5–10.1)
CHLORIDE SERPL-SCNC: 109 MMOL/L (ref 94–109)
CO2 SERPL-SCNC: 28 MMOL/L (ref 20–32)
CREAT SERPL-MCNC: 0.68 MG/DL (ref 0.52–1.04)
GFR SERPL CREATININE-BSD FRML MDRD: >90 ML/MIN/{1.73_M2}
GLUCOSE SERPL-MCNC: 94 MG/DL (ref 70–99)
POTASSIUM SERPL-SCNC: 4.6 MMOL/L (ref 3.4–5.3)
PTH-INTACT SERPL-MCNC: 221 PG/ML (ref 18–80)
SODIUM SERPL-SCNC: 137 MMOL/L (ref 133–144)

## 2021-01-31 PROCEDURE — 86803 HEPATITIS C AB TEST: CPT | Performed by: STUDENT IN AN ORGANIZED HEALTH CARE EDUCATION/TRAINING PROGRAM

## 2021-01-31 PROCEDURE — 83970 ASSAY OF PARATHORMONE: CPT | Performed by: STUDENT IN AN ORGANIZED HEALTH CARE EDUCATION/TRAINING PROGRAM

## 2021-01-31 PROCEDURE — 36415 COLL VENOUS BLD VENIPUNCTURE: CPT | Performed by: STUDENT IN AN ORGANIZED HEALTH CARE EDUCATION/TRAINING PROGRAM

## 2021-01-31 PROCEDURE — 80048 BASIC METABOLIC PNL TOTAL CA: CPT | Performed by: STUDENT IN AN ORGANIZED HEALTH CARE EDUCATION/TRAINING PROGRAM

## 2021-02-01 LAB — HCV AB SERPL QL IA: NONREACTIVE

## 2021-02-02 ENCOUNTER — TELEPHONE (OUTPATIENT)
Dept: FAMILY MEDICINE | Facility: CLINIC | Age: 66
End: 2021-02-02

## 2021-02-02 DIAGNOSIS — E21.0 PRIMARY HYPERPARATHYROIDISM (H): Primary | ICD-10-CM

## 2021-02-02 NOTE — PROGRESS NOTES
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 I would like her to see endocrinology for a consultation to see what they recommend for any additional testing or next steps. Untreated, this condition can sometimes cause complications such as weak bones, kidney stones, mood symptoms, etc, which is why I would like her to be evaluated by a specialist.    Thank you.

## 2021-02-02 NOTE — TELEPHONE ENCOUNTER
Yenni Hernandez MD at 1/31/2021 10:30 AM    Status: Signed      Please call patient and inform her that her calcium recheck is high and that this is likely due to an abnormally functioning parathyroid gland. I would like her to see endocrinology for a consultation to see what they recommend for any additional testing or next steps. Untreated, this condition can sometimes cause complications such as weak bones, kidney stones, mood symptoms, etc, which is why I would like her to be evaluated by a specialist.     Thank you.        Left message on answering machine for patient to call clinic triage for this result message.  RADHA Shah

## 2021-02-04 NOTE — TELEPHONE ENCOUNTER
Writer relayed message from Dr. Hernandez. Patient prefers [facility]. Writer placed referral to [facility]. Number relayed. Patient verbalized understanding.      Thanks! Megha Branham RN

## 2021-02-04 NOTE — TELEPHONE ENCOUNTER
[facility]. Please give below message. Annette Milton RN    Referral placed:  hyperparathyroidism (H)   Order: Endocrinology Adult Referral    [facility]   [address]   [address]   [facility] [address]   Phone: [phone]   Fax: [phone]       Comment: Your provider has referred you to: [facility]: [facility] [phone]   [email or web address]

## 2021-02-08 ENCOUNTER — OFFICE VISIT (OUTPATIENT)
Dept: DERMATOLOGY | Facility: CLINIC | Age: 66
End: 2021-02-08
Payer: COMMERCIAL

## 2021-02-08 VITALS — HEART RATE: 81 BPM | DIASTOLIC BLOOD PRESSURE: 81 MMHG | SYSTOLIC BLOOD PRESSURE: 130 MMHG

## 2021-02-08 DIAGNOSIS — C44.111 BCC (BASAL CELL CARCINOMA), EYELID, LEFT: ICD-10-CM

## 2021-02-08 DIAGNOSIS — D48.9 NEOPLASM OF UNCERTAIN BEHAVIOR: ICD-10-CM

## 2021-02-08 DIAGNOSIS — G89.18 POST-OPERATIVE PAIN: Primary | ICD-10-CM

## 2021-02-08 PROCEDURE — 14060 TIS TRNFR E/N/E/L 10 SQ CM/<: CPT | Mod: GC | Performed by: DERMATOLOGY

## 2021-02-08 PROCEDURE — 17311 MOHS 1 STAGE H/N/HF/G: CPT | Mod: GC | Performed by: DERMATOLOGY

## 2021-02-08 PROCEDURE — 17312 MOHS ADDL STAGE: CPT | Mod: GC | Performed by: DERMATOLOGY

## 2021-02-08 PROCEDURE — 14061 TIS TRNFR E/N/E/L10.1-30SQCM: CPT | Mod: 59 | Performed by: DERMATOLOGY

## 2021-02-08 RX ORDER — ACETAMINOPHEN 500 MG
1000 TABLET ORAL ONCE
Status: ACTIVE | OUTPATIENT
Start: 2021-02-08

## 2021-02-08 ASSESSMENT — PAIN SCALES - GENERAL: PAINLEVEL: MODERATE PAIN (5)

## 2021-02-08 NOTE — PROGRESS NOTES
[facility] [place] Procedure Note    Date of Service:  Feb 8, 2021  Surgery: Mohs micrographic surgery    Case 1  Repair Type: Periosteal flap with cheek advancement flap  Repair Size: 3.0 x 1.0 cm for periosteal flap and 6.5 x 3.0 cm for cheek advancement flap  Suture Material: 4-0 Vicryl, 4-0 Monocryl, 5-0 Vicryl Rapide and 5-0 FAG  Tumor Type: Basal cell carcinoma   Location: Left lower eyelid   Derm-Path Accession #:    PreOp Size: 3.7 x 1.9 cm  PostOp Size: 4.0 x 2.2 cm  Mohs Accession #:   Level of Defect: conjunctiva       Procedure:  We discussed the principles of treatment and most likely complications including scarring, bleeding, infection, swelling, pain, crusting, nerve damage, large wound,  incomplete excision, wound dehiscence,  nerve damage, recurrence, and a second procedure may be recommended to obtain the best cosmetic or functional result.    Informed consent was obtained and the patient underwent the procedure as follows:  The patient was placed supine on the operating table.  The cancer was identified, outlined with a marker, and verified by the patient.  The entire surgical field was prepped with Betadine.  The surgical site was anesthetized using 1% lidocaine with epinephrine.    The area of clinically apparent tumor was debulked. The layer of tissue was then surgically excised using a #15 blade and was then transferred onto a specimen sheet maintaining the orientation of the specimen. Hemostasis was obtained using electrocoagulation. The wound site was then covered with a dressing while the tissue samples were processed for examination.    The excised tissue was transported to the Mohs histology laboratory maintaining the tissue orientation.  The tissue specimen was relaxed so that the entire surgical margin was in a a single horizontal plane for sectioning and inked for precise mapping.  A precise reference map was drawn to reflect the  sectioning of the specimen, colored inking of the margins, and orientation on the patient.  The tissue was processed using horizontal sectioning of the base and continuous peripheral margins.  The histopathologic sections were reviewed in conjunction with the reference map.    Total blocks: 1    Total slides:  3    Residual tumor was identified and indicated in red on the reference map, identifying the location where further tissue excision was necessary. Therefore, an additional stage of Mohs Micrographic surgery was deemed necessary.     Stage II   The patient was returned to the operating room, and the area prepped in the usual manner. The residual tumor was excised using the reference map as a guide. The specimen was transfered to a labeled specimen sheet maintaining the orientation of the specimen. Hemostasis was obtained and the wound site was covered with a dressing while the tissue was processed for examination.     The excised tissue was transported to the Mohs histology laboratory maintaining orientation. The specimen margins were inked for precise mapping and a reference map was prepared for the is additional stage to maintain precise orientation as described above. The tissue was processed using horizontal sectioning of the base and continuous peripheral margins. The histopathologic sections were reviewed in conjunction with the reference map.     Total blocks: 1    Total slides:  2    There were no cancer cells visualized on examination, therefore Mohs surgery was complete.       Full Lower Eyelid Repair with Periosteal flap (posterior lamella repair) and Cheek Advancement (anterior lamella repair).     PROCEDURE:    With eye shield in place, the wound was debeveled and undermined inferiorly to the level of periosteum over the infraorbital and superior cheek areas, ensuring that he infraorbital nerve was not involved in the undermining field. Hemostasis was obtained using bipolar cautery.  A Tenzel  style advancement flap was designed extending from the lateral canthus, laterally over the temple in a superolateral curvilinear trajectory. The flap was incised with a 15 blade through the dermis and subcutaneous fat. An iris scissors was used to reach underlying periosteum of the lateral orbit. Hemostasis was obtained with electrocoagulation.     After measuring the lower lid defect, a periosteal flap was designed over the zygomatic bone lateral to the orbit. The flap was incised down to underlying bone using a 15 blade. The periosteum was then elevated from the bone using roseanna scissors. The flap was elevated in a lateral to medial fashion and 1-2 mm into the orbital rim, at which point the appropriate length to resurface the posterior lamella of the lower eyelid. Hemostasis was again obtained with electrocoagulation.     The lateral canthus periosteal flap was anchored to the medial canthus using several buried 5-0 Vicryl sutures. The flap was further approximated inferiorly to the natural remnant of lower orbit conjunctiva using a simple running 5-0 Vicryl Rapide suture. This completed the resurfacing of the posterior lamella.     Attention was then directed towards resurfacing the anterior lamella. Additional undermining of the superior cheek was performed until the cheek could be easily advanced to resurface the cutaneous lower lid. Several 4-0 Vicryl tacking sutures were used to anchor the cheek and advance the flap superiorly. Additional 4-0 Monocryl deep sutures were used laterally to close the advancement flap. 5-0 Fast absorbing sutures were utilized to approximate epidermal edges.     The eye was flushed with sterile saline and erythromycin ointment was applied under a pressure dressing which included an eye patch. The patient will return in 1 week for follow up.     Dr. Thomas De La Rosa was present for the entire Mohs surgery procedure and the reconstruction. Dr. Lara performed the first layer of Mohs  Surgery and portions of the reconstruction. Dr. De La Rosa was immediately available for the entire reconstruction and was physicially present for the key portions of the procedure.    Wayne Lara MD  February 8, 2021

## 2021-02-08 NOTE — LETTER
2/8/2021       RE: Lore Oleary  [address] [facility] [address]     Dear Colleague,    Thank you for referring your patient, Lore Oleary, to the [facility] DERMATOLOGIC SURGERY CLINIC [place] at [facility]. Please see a copy of my visit note below.    [facility] Dermatologic Surgery Clinic [place] Procedure Note    Date of Service:  Feb 8, 2021  Surgery: Mohs micrographic surgery    Case 1  Repair Type: Periosteal flap with cheek advancement flap  Repair Size: 3.0 x 1.0 cm for periosteal flap and 6.5 x 3.0 cm for cheek advancement flap  Suture Material: 4-0 Vicryl, 4-0 Monocryl, 5-0 Vicryl Rapide and 5-0 FAG  Tumor Type: Basal cell carcinoma   Location: Left lower eyelid   Derm-Path Accession #:    PreOp Size: 3.7 x 1.9 cm  PostOp Size: 4.0 x 2.2 cm  Mohs Accession #:   Level of Defect: conjunctiva       Procedure:  We discussed the principles of treatment and most likely complications including scarring, bleeding, infection, swelling, pain, crusting, nerve damage, large wound,  incomplete excision, wound dehiscence,  nerve damage, recurrence, and a second procedure may be recommended to obtain the best cosmetic or functional result.    Informed consent was obtained and the patient underwent the procedure as follows:  The patient was placed supine on the operating table.  The cancer was identified, outlined with a marker, and verified by the patient.  The entire surgical field was prepped with Betadine.  The surgical site was anesthetized using 1% lidocaine with epinephrine.    The area of clinically apparent tumor was debulked. The layer of tissue was then surgically excised using a #15 blade and was then transferred onto a specimen sheet maintaining the orientation of the specimen. Hemostasis was obtained using electrocoagulation. The wound site was then covered with a dressing while the tissue samples were  processed for examination.    The excised tissue was transported to the Mohs histology laboratory maintaining the tissue orientation.  The tissue specimen was relaxed so that the entire surgical margin was in a a single horizontal plane for sectioning and inked for precise mapping.  A precise reference map was drawn to reflect the sectioning of the specimen, colored inking of the margins, and orientation on the patient.  The tissue was processed using horizontal sectioning of the base and continuous peripheral margins.  The histopathologic sections were reviewed in conjunction with the reference map.    Total blocks: 1    Total slides:  3    Residual tumor was identified and indicated in red on the reference map, identifying the location where further tissue excision was necessary. Therefore, an additional stage of Mohs Micrographic surgery was deemed necessary.     Stage II   The patient was returned to the operating room, and the area prepped in the usual manner. The residual tumor was excised using the reference map as a guide. The specimen was transfered to a labeled specimen sheet maintaining the orientation of the specimen. Hemostasis was obtained and the wound site was covered with a dressing while the tissue was processed for examination.     The excised tissue was transported to the Mohs histology laboratory maintaining orientation. The specimen margins were inked for precise mapping and a reference map was prepared for the is additional stage to maintain precise orientation as described above. The tissue was processed using horizontal sectioning of the base and continuous peripheral margins. The histopathologic sections were reviewed in conjunction with the reference map.     Total blocks: 1    Total slides:  2    There were no cancer cells visualized on examination, therefore Mohs surgery was complete.       Full Lower Eyelid Repair with Periosteal flap (posterior lamella repair) and Cheek Advancement  (anterior lamella repair).     PROCEDURE:    With eye shield in place, the wound was debeveled and undermined inferiorly to the level of periosteum over the infraorbital and superior cheek areas, ensuring that he infraorbital nerve was not involved in the undermining field. Hemostasis was obtained using bipolar cautery.  A Tenzel style advancement flap was designed extending from the lateral canthus, laterally over the temple in a superolateral curvilinear trajectory. The flap was incised with a 15 blade through the dermis and subcutaneous fat. An iris scissors was used to reach underlying periosteum of the lateral orbit. Hemostasis was obtained with electrocoagulation.     After measuring the lower lid defect, a periosteal flap was designed over the zygomatic bone lateral to the orbit. The flap was incised down to underlying bone using a 15 blade. The periosteum was then elevated from the bone using roseanna scissors. The flap was elevated in a lateral to medial fashion and 1-2 mm into the orbital rim, at which point the appropriate length to resurface the posterior lamella of the lower eyelid. Hemostasis was again obtained with electrocoagulation.     The lateral canthus periosteal flap was anchored to the medial canthus using several buried 5-0 Vicryl sutures. The flap was further approximated inferiorly to the natural remnant of lower orbit conjunctiva using a simple running 5-0 Vicryl Rapide suture. This completed the resurfacing of the posterior lamella.     Attention was then directed towards resurfacing the anterior lamella. Additional undermining of the superior cheek was performed until the cheek could be easily advanced to resurface the cutaneous lower lid. Several 4-0 Vicryl tacking sutures were used to anchor the cheek and advance the flap superiorly. Additional 4-0 Monocryl deep sutures were used laterally to close the advancement flap. 5-0 Fast absorbing sutures were utilized to approximate epidermal  edges.     The eye was flushed with sterile saline and erythromycin ointment was applied under a pressure dressing which included an eye patch. The patient will return in 1 week for follow up.     Dr. Thomas De La Rosa was present for the entire Mohs surgery procedure and the reconstruction. Dr. Lara performed the first layer of Mohs Surgery and portions of the reconstruction. Dr. De La Rosa was immediately available for the entire reconstruction and was physicially present for the key portions of the procedure.    Wayne Lara MD  February 8, 2021         Attestation signed by Thomas De La Rosa MD at 2/22/2021  8:48 AM:    Attending attestation:  I was present for key elements of the procedure and immediately available for all other portions of the procedure.  I have reviewed the note and edited it as necessary.    Thomas De La Rosa M.D.  Professor  Director of Dermatologic Surgery  Department of Dermatology  [facility]    Dermatology Surgery Clinic  [facility] [address] [address]

## 2021-02-08 NOTE — PATIENT INSTRUCTIONS
Wound Care Instructions  I will experience scar, altered skin color, bleeding, swelling, pain, crusting and redness. I may experience altered sensation. Risks are excessive bleeding, infection, muscle weakness, thick (hypertrophic or keloidal) scar, and recurrence,. A second procedure may be recommended to obtain the best cosmetic or functional result.  Possible complications of any surgical procedure are bleeding, infection, scarring, alteration in skin color and sensation, muscle weakness in the area, wound dehiscence or seperation, or recurrence of the lesion or disease. On occasion, after healing, a secondary procedure or revision may be recommended in order to obtain the best cosmetic or functional result.   After your surgery, a pressure bandage will be placed over the area that has sutures. This will help prevent bleeding.   For the First 48 hours After Surgery:  1. Leave the pressure bandage on and keep it dry. If it should come loose, you may retape it, but do not take it off.  2. Relax and take it easy. Do not do any vigorous exercise, heavy lifting, or bending forward. This could cause the wound to bleed.  3. Post-operative pain is usually mild. You may take plain or extra strength Tylenol every 4 hours as needed (do not take more than 4,000mg in one day). Do not take any medicine that contains aspirin, ibuprofen or motrin unless you have been recommended these by a doctor.  Avoid alcohol and vitamin E as these may increase your tendency to bleed.  4. You may put an ice pack around the bandaged area for 20 minutes every 2-3 hours. This may help reduce swelling, bruising, and pain. Make sure the ice pack is waterproof so that the pressure bandage does not get wet.   5. You may see a small amount of drainage or blood on your pressure bandage. This is normal. However, if drainage or bleeding continues or saturates the bandage, you will need to apply firm pressure over the bandage with a washcloth for 15  minutes. If bleeding continues after applying pressure for 15 minutes then go to the nearest emergency room.  48 Hours After Surgery  Carefully remove the bandage and start daily wound care and dressing changes. You may also now shower and get the wound wet. Wash wound with a mild soap and water.  Use caution when washing the wound. Be gentle and do not let the forceful shower stream hit the wound directly.  PAT dry.  Daily Wound Care:  1. Wash wound with a mild soap and water.  Use caution when washing the wound, be gentle and do not let the forceful shower stream hit the wound directly.  2. PAT DRY.  3. Apply Vaseline (from a new container or tube) over the suture line with a Q-tip. It is very important to keep the wound continuously moist, as wounds heal best in a moist environment.  4.  Keep the site covered until sutures are removed, you can cover it with a Telfa (non-stick) dressing and tape or a band-aid.    5. If you are unable to keep wound covered, you must apply Vaseline every 2 - 3 hours (while awake) to ensure it is being kept moist for optimal healing. A dressing overnight is recommended to keep the area moist.   Call Us If:  1. You have pain that is not controlled with Tylenol.  2. You have signs or symptoms of an infection, such as: fever over 100 degrees F, redness, warmth, or foul-smelling or yellow/creamy drainage from the wound.  Who should I call with questions?    [facility]: [phone]     [facility]: [phone]    For urgent needs outside of business hours call the [facility] [[email or web address]] at [phone] and ask for the dermatology resident on call

## 2021-02-08 NOTE — NURSING NOTE
Chief Complaint   Patient presents with     Derm Problem     patient is here today for mohs on left lower eyelid.     Jossy CRUZ CMA

## 2021-02-15 ENCOUNTER — VIRTUAL VISIT (OUTPATIENT)
Dept: DERMATOLOGY | Facility: CLINIC | Age: 66
End: 2021-02-15
Payer: COMMERCIAL

## 2021-02-15 DIAGNOSIS — Z51.89 VISIT FOR WOUND CHECK: Primary | ICD-10-CM

## 2021-02-15 PROCEDURE — 99024 POSTOP FOLLOW-UP VISIT: CPT | Mod: 95 | Performed by: DERMATOLOGY

## 2021-02-15 ASSESSMENT — PAIN SCALES - GENERAL: PAINLEVEL: NO PAIN (0)

## 2021-02-15 NOTE — NURSING NOTE
Chief Complaint   Patient presents with     Derm Problem     Lore has a telephone visit for follow of left eyelid procedure.      Jossy Ham LPN

## 2021-02-15 NOTE — PROGRESS NOTES
Dermatology Phone Visit: Phone Number:[phone]     Dermatology Problem List:  (1) BCC, Left lower eyelid, s/p MMS with periosteal flap and cheek advancement flap reconstruction on 2/8/2021    Patient opted to conduct today's return visit via telephone vs an in person visit to the clinic.    Encounter Date: Feb 15, 2021    Chief complaint:   Chief Complaint   Patient presents with     Derm Problem     Lore has a telephone visit for follow of left eyelid procedure.        I spoke with: Lore Oleary    The reason for the telephone visit was:   Follow up after MMS for large BCC of the left lower eyelid.     Pertinent history and review of systems:  Patient underwent MMS for large (90-95% of lower eyelid) BCC of the left lower eyelid and periosteal transposition flap and cheek advancement flap reconstruction on 2/8/2021.     Today she states that her pain is improving. The first few days she experienced pain that prevented her from sleeping flat and had to sleep in a recliner. She is now feeling better. The area remains quite swollen. She denies significant drainage, irritation of the conjunctiva, expanding redness, increasing tenderness.     We are still awaiting photos to be sent.     Assessment/Advice/instructions given to patient/guardian including prescriptions, follow up appointment or orders for diagnostic testing:    (1) BCC, Left lower eyelid, s/p MMS with periosteal flap and cheek advancement flap reconstruction on 2/8/2021    RTC in 1-2 weeks for virtual visit, depending on her photos.     Phone call contact time:  Call Started at: 1420  Call Ended at: 1430    Staff and fellow:    Wayne Lara MD  PGY-6    Micrographic Surgery and Dermatologic Oncology Fellow  February 15, 2021

## 2021-02-15 NOTE — LETTER
2/15/2021       RE: Lore Oleary  [address]  [facility] [address]     Dear Colleague,    Thank you for referring your patient, Lore Oleary, to the [facility] DERMATOLOGIC SURGERY CLINIC [place] at [facility]. Please see a copy of my visit note below.    Dermatology Phone Visit: Phone Number:[phone]     Dermatology Problem List:  (1) BCC, Left lower eyelid, s/p MMS with periosteal flap and cheek advancement flap reconstruction on 2/8/2021    Patient opted to conduct today's return visit via telephone vs an in person visit to the clinic.    Encounter Date: Feb 15, 2021    Chief complaint:   Chief Complaint   Patient presents with     Derm Problem     Lore has a telephone visit for follow of left eyelid procedure.        I spoke with: Lore Oleary    The reason for the telephone visit was:   Follow up after MMS for large BCC of the left lower eyelid.     Pertinent history and review of systems:  Patient underwent MMS for large (90-95% of lower eyelid) BCC of the left lower eyelid and periosteal transposition flap and cheek advancement flap reconstruction on 2/8/2021.     Today she states that her pain is improving. The first few days she experienced pain that prevented her from sleeping flat and had to sleep in a recliner. She is now feeling better. The area remains quite swollen. She denies significant drainage, irritation of the conjunctiva, expanding redness, increasing tenderness.     We are still awaiting photos to be sent.     Assessment/Advice/instructions given to patient/guardian including prescriptions, follow up appointment or orders for diagnostic testing:    (1) BCC, Left lower eyelid, s/p MMS with periosteal flap and cheek advancement flap reconstruction on 2/8/2021    RTC in 1-2 weeks for virtual visit, depending on her photos.     Phone call contact time:  Call Started at: 1420  Call Ended at: 1430    Staff and  fellow:    Wayne Lara MD  PGY-6    Micrographic Surgery and Dermatologic Oncology Fellow  February 15, 2021           Attestation signed by Thomas De La Rosa MD at 2/23/2021  2:34 PM:  Attending Attestation  I attest that the Fellow recorded the interview and exam that I personally performed.  I have reviewed the note and edited it as necessary.    Thomas De La Rosa M.D.  Professor  Director of Dermatologic Surgery  Department of Dermatology  [facility]

## 2021-02-20 ENCOUNTER — TELEPHONE (OUTPATIENT)
Dept: DERMATOLOGY | Facility: CLINIC | Age: 66
End: 2021-02-20

## 2021-02-21 ENCOUNTER — TELEPHONE (OUTPATIENT)
Dept: DERMATOLOGY | Facility: CLINIC | Age: 66
End: 2021-02-21

## 2021-02-21 NOTE — TELEPHONE ENCOUNTER
Writer called patient to check on her symptoms that were discussed on 2/20/21 (see separate telephone encounter).  Patient reports that her symptoms are the same today and that the swelling is no worse than it was yesterday.  Says she was successfully able to sleep with a pillow last night and plans to use ice a few times today.    Will ask one of our schedulers on 2/22 to reach out to the patient to help her schedule an in person follow-up appointment this week.    Jillian Menjivar MD  Dermatology Resident

## 2021-02-21 NOTE — TELEPHONE ENCOUNTER
Writer was contacted by patient as the dermatology resident on-call d/t post-op concerns. Patient underwent periosteal flap with cheek advancement for a large BCC on the L lower eyelid on 2/8 with Dr. De La Rosa. Patient reports that she has noticed more swelling beneath the left eye over the last 3 days. Feels that the pain has been maybe a touch worse d/t the swelling. Endorses a little bit of blurry vision but thinks it s from some watery drainage coming from the incision. Denies any bloody or pus drainage from the incision. Denies fevers/chills. Patient reports that she has not been using any ice but plans to start. Says that she normally sleeps without a pillow, and tried to sleep with a pillow to help reduce some of the swelling but was not able to tolerate it.    Writer reviewed a photograph that patient provided. Flap appeared mildly edematous but otherwise appeared to be healing appropriately.     Writer recommended cold compress on flap for 10 minutes several times a day. Recommended that she try to sleep with a pillow but we understand if she is not able to tolerate this as she normally sleeps without one.    Writer will check in on patient again via phone on 2/21. We will plan to have patient follow-up in our clinic for an in-person exam on either Monday or Tuesday next week.    Discussed with Mohs surgery fellow on-call, Dr. Wayne Lara.

## 2021-02-22 ENCOUNTER — TELEPHONE (OUTPATIENT)
Dept: DERMATOLOGY | Facility: CLINIC | Age: 66
End: 2021-02-22

## 2021-02-22 NOTE — TELEPHONE ENCOUNTER
Called patient to schedule in person follow up appointment with Dr. De La Rosa. Patient has to call family who is her transportation. She stated she would call us right back.

## 2021-02-24 NOTE — TELEPHONE ENCOUNTER
Called patient to schedule consult (in person) with Dr. De La Rosa, there was no answer.  Left message with my direct line [phone].

## 2021-02-25 ENCOUNTER — TELEPHONE (OUTPATIENT)
Dept: DERMATOLOGY | Facility: CLINIC | Age: 66
End: 2021-02-25

## 2021-02-25 NOTE — TELEPHONE ENCOUNTER
[facility] Call Center    Phone Message    May a detailed message be left on voicemail: yes     Reason for Call: Other: Pt called back and left a message at a direct number given from clinic [phone]. Please call Pt back to schedule , thank you     Action Taken: Message routed to:  [facility] Derm    Travel Screening: Not Applicable

## 2021-02-25 NOTE — TELEPHONE ENCOUNTER
Called patient to schedule follow up appointment with Dr. De La Rosa. Patient needs to check with family again for transportation. Writer will call patient back in 15 mins.

## 2021-03-03 ENCOUNTER — OFFICE VISIT (OUTPATIENT)
Dept: DERMATOLOGY | Facility: CLINIC | Age: 66
End: 2021-03-03
Payer: COMMERCIAL

## 2021-03-03 DIAGNOSIS — C44.111 BCC (BASAL CELL CARCINOMA), EYELID, LEFT: Primary | ICD-10-CM

## 2021-03-03 PROCEDURE — 99024 POSTOP FOLLOW-UP VISIT: CPT | Performed by: DERMATOLOGY

## 2021-03-03 ASSESSMENT — PAIN SCALES - GENERAL: PAINLEVEL: NO PAIN (0)

## 2021-03-03 NOTE — NURSING NOTE
Chief Complaint   Patient presents with     Derm Problem     Lore is here for follow up of her left eye.      Jossy Ham LPN

## 2021-03-03 NOTE — LETTER
3/3/2021       RE: Lore Oleary  [address]  [facility] [address]     Dear Colleague,    Thank you for referring your patient, Lore Oleary, to the [facility] DERMATOLOGIC SURGERY CLINIC [place] at [facility]. Please see a copy of my visit note below.    Here for follow up.   Repair looks good.  Lower eyelid position good.  Patient is having some decreased upper lid mobility due to lack of full contraction of orbicularis.  She has no foreign body sensation and her cornea is not injected.    Will monitor.    Thomas De La Rosa MD

## 2021-03-04 NOTE — PROGRESS NOTES
Here for follow up.   Repair looks good.  Lower eyelid position good.  Patient is having some decreased upper lid mobility due to lack of full contraction of orbicularis.  She has no foreign body sensation and her cornea is not injected.    Will monitor.    Thomas De La Rosa MD

## 2021-04-26 ENCOUNTER — OFFICE VISIT (OUTPATIENT)
Dept: DERMATOLOGY | Facility: CLINIC | Age: 66
End: 2021-04-26
Payer: COMMERCIAL

## 2021-04-26 DIAGNOSIS — C44.111 BCC (BASAL CELL CARCINOMA), EYELID, LEFT: Primary | ICD-10-CM

## 2021-04-26 PROCEDURE — 99024 POSTOP FOLLOW-UP VISIT: CPT | Performed by: DERMATOLOGY

## 2021-04-26 ASSESSMENT — PAIN SCALES - GENERAL: PAINLEVEL: NO PAIN (0)

## 2021-04-26 NOTE — LETTER
4/26/2021       RE: Lore Oleary  [address]  [facility] [address]     Dear Colleague,    Thank you for referring your patient, Lore Oleary, to the [facility] DERMATOLOGIC SURGERY CLINIC [place] at [facility]. Please see a copy of my visit note below.    Pt doing well.  Lid closure impoving.  No irritation of eye.      S/P Mohs for large BCC with periosteal flap and cheek lift closure.  Good early result will monitor.    Thomas De La Rosa MD

## 2021-04-26 NOTE — NURSING NOTE
Chief Complaint   Patient presents with     Derm Problem     patient is here today for follow up post mohs.      Jossy CRUZ CMA

## 2021-05-03 NOTE — PROGRESS NOTES
Pt doing well.  Lid closure impoving.  No irritation of eye.      S/P Mohs for large BCC with periosteal flap and cheek lift closure.  Good early result will monitor.    Thomas De La Rosa MD

## 2021-08-06 ENCOUNTER — TELEPHONE (OUTPATIENT)
Dept: DERMATOLOGY | Facility: CLINIC | Age: 66
End: 2021-08-06

## 2021-08-06 NOTE — TELEPHONE ENCOUNTER
Will have patient come in for post op follow-up as this is due now  We will ask her to sign photo consent as well to be included in a study by Dr. Wayne Lara (previous Mohs fellow) at that appointment

## 2021-08-10 NOTE — PATIENT INSTRUCTIONS
GENERAL ID SERVICE: PROGRESS NOTE  Patient:  Dax Villareal, Date of birth 1990, Medical record number [id number]  Date of Admission: 6/28/2021  Date of Visit:  8/10/2021         Assessment and Recommendations:   Problem List:  1. Pleural effusion               - Cx - neg for aerobic/ anaerobic 7/28/21  2. Lactic acidosis  3. 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 Leukocytosis  5. RADHA requiring iHD  6. Decompensated ESLD c/b HE  7. Alcoholic hepatitis  8. Ascites                - peritoneal fluid - cx neg on 7/30/21  8. Sputum Enterococcus faecium ( VSE) and C.albicans        Discussion:  Dax Villareal is a 30 year old male with PMHx significant for alcohol use disorder who was admitted to [facility] in [place], [place] for abdominal pain, nausea and vomiting, found to have end stage liver disease c/b hepatic encephalopathy, acute renal failure requiring iHD, SBP and acute necrotizing pancreatitis. ID last signed off 7/23 with recs to transition from broad spectrum antibiotics back to po cipro on 7/26 (see previous note for additional detail).      ID was called back because patient has since undergone R thoracentesis on 7/28 with findings consistent with exudative effusion with an LDH and glucose that were overall were not consistent with empyema and the gross fluid appearance was not noted to appear as pus either, though a pH was not sent. He has had intermittently elevated lactate and so primary team has patient on empiric zosyn while awaiting necrosectomy procedure. He otherwise remains afebrile, hemodynamically stable. Leukocytosis had been improving and likely related to ongoing necrotizing pancreatitis; increased from 22.8-->27.5 on 8/5. On chart review, having significant number of liquid stools despite minimal doses of lactulose and relatively stable tube feeds. C.diff negative 8/5. Zosyn may cause diarrhea  [facility] Dermatology Visit    Thank you for allowing us to participate in your care. Your findings, instructions and follow-up plan are as follows:         When should I call my doctor?    If you are worsening or not improving, please, contact us or seek urgent care as noted below.     Who should I call with questions (adults)?    [facility] (adult and pediatric): [phone]     [facility] (adult): [phone]    For urgent needs outside of business hours call the [facility] at [phone] and ask for the dermatology resident on call    If this is a medical emergency and you are unable to reach an ER, Call 911      Who should I call with questions (pediatric)?  [facility]- Pediatric Dermatology  Dr. Joselyn Porter, Dr. Jake Crowder, Dr. Ghislaine Sanchez, Sydnie Hernandez, PA  Dr. Candace Bellamy, Dr. Tala Callahan & Dr. Jose R Rivera  Non Urgent  Nurse Triage Line; [phone]- Sherlyn and Sun RN Care Coordinators   [place] (/Complex ) [phone]    If you need a prescription refill, please contact your pharmacy. Refills are approved or denied by our Physicians during normal business hours, Monday through Fridays  Per office policy, refills will not be granted if you have not been seen within the past year (or sooner depending on your child's condition)    Scheduling Information:  Pediatric Appointment Scheduling and Call Center [phone]  Radiology Scheduling- [phone]  Sedation Unit Scheduling- [phone]  [place] Scheduling- General [phone]; Pediatric Dermatology [phone]  Main  Services: [phone]  Wol[place]: [phone]  Australian: [phone]  Hmong/Divehi/Syriac: [phone]  Preadmission Nursing Department Fax Number: [phone] (Fax all pre-operative paperwork to this number)    For urgent matters arising during evenings,  "in some patients; recommend transition to cefepime + Flagyl in an effort to reduce diarrhea. Repeat CXR 7/28 (after thora) showing continued moderate sized R pleural effusion; large effusion on CXR 8/2. CT AP notes improved aeration of right lung with mixed GGOs in the right middle lung that could represent sequela of reexpansion, although infection not excluded. If pneumonia is a concern would recheck sputum sample. Previously grown VSE faecium has  been adequately treated with >1 week of linezolid. Suspect the exudative effusion is due to combination of hepatic hydrothorax and pancreatitis, cultures have been negative thus far with most recent cx on 7/28. GI team on board and performing weekly necronectomy. Most recent necrosectomy on 8/6/21 and showed newr complete debridement of the walled-off necrosis. Follow up CT abdomen/pelvis from today showed \"decreased size of walled off necrotic collections in the upper abdomen with unchanged position of drains\". GI prefers to continue antibiotics until final necrosectomy.     Recommendations:     1. Please continue cefepime 1g IV q24hrs (renally adjusted for HD) and flagyl 500mg PO or IV q8hrs   - GI recommends to continue antibiotics until final necrosectomy    2. I will sign off at this moment. If any need for final antibiotic recommendations after final necrosectomy please re-contact us          The General ID team will continue to follow this patient. Please feel free to call with any questions.     Yvette Marroquin MD  Date of Service: 08/10/21  Pager: [phone]             Physical Exam:   /57   Pulse (!) 124   Temp 98.3  F (36.8  C) (Oral)   Resp 24   Ht 1.676 m (5' 5.98\")   Wt 70 kg (154 lb 5.2 oz)   SpO2 100%   BMI 24.92 kg/m         Exam:  GENERAL:  Not in acute distress  HEAD: Normocephalic and atraumatic  ENT:  No hearing impairment  LUNGS:  Clear to auscultation - no rales, rhonchi or wheezes  CARDIOVASCULAR:  Regular rate and rhythm, normal S1 " weekends, or holidays that cannot wait for normal business hours please call [phone] and ask for the Dermatology Resident On-Call to be paged.         S2  ABDOMEN: Moderately distended. Drains in place  NEUROLOGIC:  Mentation intact and speech normal           Laboratory Data:     Creatinine   Date Value Ref Range Status   08/10/2021 3.81 (H) 0.66 - 1.25 mg/dL Final   08/09/2021 2.81 (H) 0.66 - 1.25 mg/dL Final   08/08/2021 2.01 (H) 0.66 - 1.25 mg/dL Final   08/07/2021 3.31 (H) 0.66 - 1.25 mg/dL Final   08/06/2021 2.36 (H) 0.66 - 1.25 mg/dL Final   07/11/2021 3.20 (H) 0.66 - 1.25 mg/dL Final   07/10/2021 2.21 (H) 0.66 - 1.25 mg/dL Final   07/09/2021 3.37 (H) 0.66 - 1.25 mg/dL Final   07/08/2021 2.60 (H) 0.66 - 1.25 mg/dL Final   07/07/2021 3.91 (H) 0.66 - 1.25 mg/dL Final     WBC   Date Value Ref Range Status   07/11/2021 25.8 (H) 4.0 - 11.0 10e9/L Final   07/10/2021 23.6 (H) 4.0 - 11.0 10e9/L Final   07/09/2021 24.7 (H) 4.0 - 11.0 10e9/L Final   07/08/2021 25.9 (H) 4.0 - 11.0 10e9/L Final   07/07/2021 24.6 (H) 4.0 - 11.0 10e9/L Final     WBC Count   Date Value Ref Range Status   08/10/2021 16.8 (H) 4.0 - 11.0 10e3/uL Final   08/09/2021 15.1 (H) 4.0 - 11.0 10e3/uL Final   08/09/2021 17.4 (H) 4.0 - 11.0 10e3/uL Final   08/08/2021 18.9 (H) 4.0 - 11.0 10e3/uL Final   08/08/2021 18.8 (H) 4.0 - 11.0 10e3/uL Final     Hemoglobin   Date Value Ref Range Status   08/10/2021 7.9 (L) 13.3 - 17.7 g/dL Final   07/11/2021 7.5 (L) 13.3 - 17.7 g/dL Final     Platelet Count   Date Value Ref Range Status   08/10/2021 298 150 - 450 10e3/uL Final   07/11/2021 204 150 - 450 10e9/L Final     Lab Results   Component Value Date     (L) 08/10/2021    BUN 30 08/10/2021    CO2 19 (L) 08/10/2021     CRP Inflammation   Date Value Ref Range Status   08/07/2021 81.0 (H) 0.0 - 8.0 mg/L Final   08/06/2021 57.0 (H) 0.0 - 8.0 mg/L Final   08/05/2021 76.0 (H) 0.0 - 8.0 mg/L Final   08/04/2021 89.0 (H) 0.0 - 8.0 mg/L Final   08/03/2021 88.0 (H) 0.0 - 8.0 mg/L Final   10/06/2020 16.9 (H) 0.0 - 8.0 mg/L Final           Pertinent Recent Microbiology Data:   No results for input(s): CULT, SDES in  the last 168 hours.         Imaging:     Recent Results (from the past 48 hour(s))   POC US GUIDE FOR THORACENTESIS    Impression    Rt thorax with pleural effusion prior to thoracentesis (us guided)   US Upper Extremity Venous Duplex Bilat    Narrative    EXAMINATION: DOPPLER VENOUS ULTRASOUND OF BILATERAL UPPER EXTREMITIES,  8/9/2021 2:50 PM     COMPARISON: None.    HISTORY: History of PE, assessing extremities for clot burden    TECHNIQUE:  Gray-scale evaluation with compression, spectral flow, and  color Doppler assessment of the deep venous system of both upper  extremities.    FINDINGS:  Normal blood flow and waveforms are demonstrated in the internal  jugular, innominate, subclavian, and axillary veins bilaterally. There  is normal compressibility of the brachial, basilic and cephalic veins  bilaterally.      Impression    IMPRESSION:  1.  No evidence of deep venous thrombosis in either upper extremity.    I have personally reviewed the examination and initial interpretation  and I agree with the findings.    MIGUEL WALKER MD         SYSTEM ID:  [id number]   US Lower Extremity Venous Duplex Bilateral    Narrative    EXAMINATION: DOPPLER VENOUS ULTRASOUND OF BILATERAL LOWER EXTREMITIES,  8/9/2021 2:51 PM     COMPARISON: None.    HISTORY: History of PE, assessing extremities for clot burden    TECHNIQUE:  Gray-scale evaluation with compression, spectral flow and  color Doppler assessment of the deep venous system of both legs from  groin to knee, and then at the ankles.    FINDINGS:  In both lower extremities, the common femoral, femoral, popliteal and  posterior tibial veins demonstrate normal compressibility and blood  flow.      Impression    IMPRESSION:  1.  No evidence of deep venous thrombosis in either lower extremity.    I have personally reviewed the examination and initial interpretation  and I agree with the findings.    MIGUEL WALKER MD         SYSTEM ID:  [id number]   XR Chest Port 1 View     Narrative    Portable chest    INDICATION: Right thoracentesis    COMPARISON: 7/22/2021    FINDINGS: Interval decrease in the right pleural effusion. Right IJ  large bore catheter tip is in the proximal right atrium. Left PICC tip  is at the cavoatrial junction. No obvious pneumothorax. Heart size  normal.      Impression    IMPRESSION: Decrease of right pleural effusion postthoracentesis. No  evidence of pneumothorax radiographically.    CHELLE STARKS MD         SYSTEM ID:  [id number]   XR Video Swallow with SLP or OT    Narrative    Examination:  Modified Barium Swallow Study with Speech Pathology,  8/10/2021 10:43 AM.    Comparison: None    History: Possible aspiration event, per SLP    Technique: Under fluoroscopic guidance, the patient was given orally  administered barium of varying consistencies in the presence of the  speech pathology service.     Findings:   The patient was evaluated on thin and pudding consistencies. The oral  phase was normal. There is no delay in swallowing or pooling of  contrast. There is no laryngeal penetration or tracheal aspiration  with any of the administered consistency of barium including thin  barium and pudding consistencies. No significant residue within the  valleculae or piriform sinus.      Fluoroscopy time: 0.5 minutes        Impression    Impression:   1. No laryngeal penetration or tracheal aspiration with thin and  pudding consistencies.   2. Please see the speech pathologist report for additional details.        I have personally reviewed the examination and initial interpretation  and I agree with the findings.    LESLYE LACKEY MD         SYSTEM ID:  [id number]   CTA Chest Abdomen Pelvis w Contrast    Narrative    CTA chest abdomen pelvis with contrast 8/10/2021 1:16 PM    CLINICAL HISTORY: PE suspected, high prob.     COMPARISONS: 8/8/2021, 8/5/2021    REFERRING PROVIDER: ERICA HAZEL    TECHNIQUE: Volumetric helical acquisition of CT images of the  from the  lung apices through the pelvis were acquired after the administration  of intravenous contrast. Images obtained in the pulmonary arterial  phase of the chest, and systemic arterial phase of the chest, abdomen,  pelvis. Post-processed multiplanar and/or MIP reformations were  obtained, archived to PACS and used in interpretation of this study.     CONTRAST: 100 mL Isovue 370    DOSE (DLP): 453 mGy*cm    FINDINGS:     Support devices: Right internal jugular approach dual-lumen central  venous catheter tip is in the high right atrium. Left arm PICC  terminates in the high right atrium. Percutaneous gastrojejunostomy  tube terminates in the jejunum. Left lateral approach abdominal drain  unchanged in position within the left upper quadrant collection.  Stable appearance of cystogastrostomy tube.    Chest:  Contrast bolus is good. No acute pulmonary embolism. Aorta and its  major thoracic branches are normal.    Partially visualized thyroid gland is unremarkable. Stable prominent  axillary, mediastinal, and hilar lymph nodes, likely reactive. Heart  is not enlarged. No pericardial effusion. Major thoracic vessels are  normal in caliber and configuration. Esophagus is normal.    Trachea and central airways are clear debris. Left lung is relatively  clear. Decreased large right pleural effusion with improved aeration  of the right upper lobe. Continued complete atelectasis of the right  lower lobe and portions of the right middle lobe. Patchy groundglass  opacities in the right upper lobe and right middle lobe, previously  atelectatic on 8/8/2021.    Abdomen/pelvis:  Major abdominal arteries are patent and normal in caliber.    Sequela of necrotizing pancreatitis with lobulated walled off necrosis  in the central abdomen adjacent to the pancreatic tail measuring  approximately 11.1 x 2.6 cm, previously measuring 11.0 x 4.2 cm, with  decreased foci of air throughout the collection. Unchanged position of  a large  bore drain within the collection via left lateral abdominal  wall and cystogastrostomy tubes. Stable diffuse hazy mesenteric edema  and small to moderate volume ascites, predominantly layering in the  pelvis, around the liver, and in the paracolic gutters.    Liver measures up to 23.2 cm in craniocaudal dimension, similar to  prior exam. No focal hepatic lesion. Gallbladder, bile ducts, and  adrenal glands are unremarkable. Spleen measures up to 14.7 cm. No  significant renal cortical enhancement in the arterial phase. Ureters  and urinary bladder are unremarkable, with contrast in the bladder  from prior contrast enhanced exam. No dilated bowel. Retained barium  throughout the small bowel from earlier swallow study. Prominent  mesenteric, retroperitoneal, and jan hepatis lymph nodes, similar to  prior exam and likely reactive.    Bones and soft tissues:  Anasarca. Transitional lumbosacral vertebra with lumbarization of S1.  No acute or suspicious osseous lesion.      Impression    Impression:  1. No acute pulmonary embolus.  2. Aorta and major arteries are patent and normal in caliber.  3. Decreased large right pleural effusion with continued complete  atelectasis of the right lower lobe but improved aeration of the right  upper lobe and right middle lobe since 8/8/2021.  4. Patchy opacities in the right upper lobe and right middle lobe,  sequela of reexpansion versus superimposed infection.  5. Decreased size of walled off necrotic collections in the upper  abdomen with unchanged position of drains.  6. Stable hepatosplenomegaly.  7. No significant renal cortical enhancement in the arterial phase,  consistent with known kidney injury.  8. Stable mesenteric edema, ascites, and anasarca.    I have personally reviewed the examination and initial interpretation  and I agree with the findings.    GABY BILLINGSLEY         SYSTEM ID:  [id number]

## 2021-08-20 ENCOUNTER — TELEPHONE (OUTPATIENT)
Dept: DERMATOLOGY | Facility: CLINIC | Age: 66
End: 2021-08-20

## 2021-08-20 NOTE — TELEPHONE ENCOUNTER
Called and left message for patient to return phone call to schedule appointment for Photo Consent.    Javier Miller, Procedure

## 2021-09-17 ENCOUNTER — TELEPHONE (OUTPATIENT)
Dept: DERMATOLOGY | Facility: CLINIC | Age: 66
End: 2021-09-17

## 2021-09-17 NOTE — TELEPHONE ENCOUNTER
Called and left message for patient to return call to get Photo Consent.    Javier Miller, Procedure

## 2024-06-14 ENCOUNTER — ANCILLARY PROCEDURE (OUTPATIENT)
Dept: GENERAL RADIOLOGY | Facility: CLINIC | Age: 69
End: 2024-06-14
Attending: PHYSICIAN ASSISTANT
Payer: COMMERCIAL

## 2024-06-14 ENCOUNTER — OFFICE VISIT (OUTPATIENT)
Dept: URGENT CARE | Facility: URGENT CARE | Age: 69
End: 2024-06-14
Payer: COMMERCIAL

## 2024-06-14 VITALS
HEART RATE: 68 BPM | SYSTOLIC BLOOD PRESSURE: 118 MMHG | BODY MASS INDEX: 18.33 KG/M2 | OXYGEN SATURATION: 98 % | DIASTOLIC BLOOD PRESSURE: 62 MMHG | TEMPERATURE: 97.4 F | RESPIRATION RATE: 21 BRPM | WEIGHT: 103.5 LBS

## 2024-06-14 DIAGNOSIS — S69.91XA INJURY OF RIGHT WRIST, INITIAL ENCOUNTER: ICD-10-CM

## 2024-06-14 DIAGNOSIS — S52.571A OTHER CLOSED INTRA-ARTICULAR FRACTURE OF DISTAL END OF RIGHT RADIUS, INITIAL ENCOUNTER: Primary | ICD-10-CM

## 2024-06-14 PROCEDURE — 73110 X-RAY EXAM OF WRIST: CPT | Mod: TC | Performed by: RADIOLOGY

## 2024-06-14 PROCEDURE — 99203 OFFICE O/P NEW LOW 30 MIN: CPT | Performed by: PHYSICIAN ASSISTANT

## 2024-06-14 ASSESSMENT — PAIN SCALES - GENERAL: PAINLEVEL: EXTREME PAIN (8)

## 2024-06-14 ASSESSMENT — ENCOUNTER SYMPTOMS: JOINT SWELLING: 1

## 2024-06-14 NOTE — PATIENT INSTRUCTIONS
Icing will help with the swelling   I recommend using ice bucket as this is significantly better at reducing inflammation and pain than an ice pack.  Ice several times throughout the day. Perform 4 or more times per day as needed.    For pain control you can rotate between acetaminophen and ibuprofen  Ibuprofen 600 mg up to 4 times daily with food or milk and Acetaminophen 1000 mg every 8 hours as needed    Wear the brace at all times unless for hygiene purposes or when icing  Follow up with orthopedics for further evaluation

## 2024-06-14 NOTE — PROGRESS NOTES
Assessment & Plan        1. Other closed intra-articular fracture of distal end of right radius, initial encounter    -Patient to follow-up with orthopedics for further evaluation and treatment  -She has been advised to start icing the wrist and forearm in order to reduce the swelling  -She was supplied with a wrist brace to wear at all times, can remove for hygiene purposes and when icing.  -For pain acetaminophen and ibuprofen recommended  - Wrist/Arm/Hand Bracking Supplies Order Wrist Brace; Right; with thumb spica  - Orthopedic  Referral; Future    2. Injury of right wrist, initial encounter    - XR Wrist Right G/E 3 Views  - Wrist/Arm/Hand Bracking Supplies Order Wrist Brace; Right; with thumb spica  - Orthopedic  Referral; Future    Results for orders placed or performed in visit on 06/14/24   XR Wrist Right G/E 3 Views     Status: None    Narrative    WRIST RIGHT THREE OR MORE VIEWS   6/14/2024 11:05 AM     HISTORY:  Fall on driveway, may be FOOSH injury, rule out fracture.  Right wrist pain. Injury of right wrist, initial encounter.    COMPARISON: None.      Impression    IMPRESSION:  1. Comminuted intra-articular fracture of the distal radius. There is  up to 6 mm of displacement of fracture fragments and there is mild  dorsal tilt of the distal radial articular surface.  2. Probable nondisplaced ulnar styloid process fracture.  3. Diffuse bone demineralization, chondrocalcinosis and multifocal  osteoarthrosis.     LEXI GARCIA MD         SYSTEM ID:  [id number]         Patient Instructions   Icing will help with the swelling   I recommend using ice bucket as this is significantly better at reducing inflammation and pain than an ice pack.  Ice several times throughout the day. Perform 4 or more times per day as needed.    For pain control you can rotate between acetaminophen and ibuprofen  Ibuprofen 600 mg up to 4 times daily with food or milk and Acetaminophen 1000 mg every 8 hours as  needed    Wear the brace at all times unless for hygiene purposes or when icing  Follow up with orthopedics for further evaluation      Return for Follow up with Orthopedics.    At the end of the encounter, I discussed results, diagnosis, medications. Discussed red flags for immediate return to clinic/ER, as well as indications for follow up if no improvement. Patient understood and agreed to plan. Patient was stable for discharge.    Grey Torrez is a 69 year old female who presents to clinic today  for the following health issues:  Chief Complaint   Patient presents with    Urgent Care     Pt. Stated that fell on driveway and hurt top right side of head and right wrist on Tuesday night. Swollen and painful with pressure and movement.       HPI    Patient reports fall in her driveway 2 days ago.  She reports that she fell, hit her head did not lose consciousness, somehow injured her right wrist.  She now reports wrist pain and swelling.  She felt pain immediately after the fall.  She reports that pain worsens with any kind of movement.  She rates the pain at 8/10 when worse.  She took acetaminophen, has not been icing.  She denies loss of sensation, weakness.      Review of Systems   Musculoskeletal:  Positive for joint swelling.   All other systems reviewed and are negative.      Problem List:  2021-02: Primary hyperparathyroidism (H24)  2021-01: High calcium levels  2021-01: High serum low-density lipoprotein (LDL)      Past Medical History:   Diagnosis Date    Patient denies medical problems        Social History     Tobacco Use    Smoking status: Former     Types: Cigarettes    Smokeless tobacco: Never   Substance Use Topics    Alcohol use: Not on file           Objective    /62 (BP Location: Right arm, Patient Position: Sitting, Cuff Size: Adult Regular)   Pulse 68   Temp 97.4  F (36.3  C) (Temporal)   Resp 21   Wt 46.9 kg (103 lb 8 oz)   SpO2 98%   BMI 18.33 kg/m    Physical Exam  Vitals  and nursing note reviewed.   Constitutional:       Appearance: Normal appearance.   Cardiovascular:      Rate and Rhythm: Normal rate and regular rhythm.   Pulmonary:      Effort: Pulmonary effort is normal.      Breath sounds: Normal breath sounds.   Musculoskeletal:      Right wrist: Swelling and tenderness present. Decreased range of motion.        Arms:       Cervical back: Normal range of motion and neck supple.   Skin:     General: Skin is warm and dry.      Findings: No rash.   Neurological:      General: No focal deficit present.      Mental Status: She is alert and oriented to person, place, and time.      Sensory: Sensation is intact.      Motor: Weakness present.   Psychiatric:         Mood and Affect: Mood normal.         Behavior: Behavior normal.              Chari Ricketts PA-C

## 2024-06-18 ENCOUNTER — OFFICE VISIT (OUTPATIENT)
Dept: ORTHOPEDICS | Facility: CLINIC | Age: 69
End: 2024-06-18
Attending: PHYSICIAN ASSISTANT
Payer: COMMERCIAL

## 2024-06-18 ENCOUNTER — ANCILLARY PROCEDURE (OUTPATIENT)
Dept: GENERAL RADIOLOGY | Facility: CLINIC | Age: 69
End: 2024-06-18
Attending: STUDENT IN AN ORGANIZED HEALTH CARE EDUCATION/TRAINING PROGRAM
Payer: COMMERCIAL

## 2024-06-18 VITALS
SYSTOLIC BLOOD PRESSURE: 126 MMHG | HEIGHT: 63 IN | DIASTOLIC BLOOD PRESSURE: 67 MMHG | WEIGHT: 103 LBS | BODY MASS INDEX: 18.25 KG/M2

## 2024-06-18 DIAGNOSIS — S69.91XA INJURY OF RIGHT WRIST, INITIAL ENCOUNTER: ICD-10-CM

## 2024-06-18 DIAGNOSIS — S52.571A OTHER CLOSED INTRA-ARTICULAR FRACTURE OF DISTAL END OF RIGHT RADIUS, INITIAL ENCOUNTER: Primary | ICD-10-CM

## 2024-06-18 PROCEDURE — 73110 X-RAY EXAM OF WRIST: CPT | Mod: TC | Performed by: RADIOLOGY

## 2024-06-18 PROCEDURE — 99207 MEDIUM JOINT INJECTION/ARTHROCENTESIS: CPT | Performed by: STUDENT IN AN ORGANIZED HEALTH CARE EDUCATION/TRAINING PROGRAM

## 2024-06-18 PROCEDURE — 25605 CLTX DST RDL FX/EPHYS SEP W/: CPT | Mod: RT | Performed by: STUDENT IN AN ORGANIZED HEALTH CARE EDUCATION/TRAINING PROGRAM

## 2024-06-18 PROCEDURE — 99203 OFFICE O/P NEW LOW 30 MIN: CPT | Mod: 57 | Performed by: STUDENT IN AN ORGANIZED HEALTH CARE EDUCATION/TRAINING PROGRAM

## 2024-06-18 RX ORDER — LIDOCAINE HYDROCHLORIDE 10 MG/ML
10 INJECTION, SOLUTION INFILTRATION; PERINEURAL
Status: SHIPPED | OUTPATIENT
Start: 2024-06-18

## 2024-06-18 RX ADMIN — LIDOCAINE HYDROCHLORIDE 10 ML: 10 INJECTION, SOLUTION INFILTRATION; PERINEURAL at 15:40

## 2024-06-18 NOTE — LETTER
6/18/2024      Lore Oleary  [address]  [facility] [address]      Dear Colleague,    Thank you for referring your patient, Lore Oleary, to the [facility] [place]. Please see a copy of my visit note below.    Orthopaedic Surgery Hand and Upper Extremity Clinic H&P Note:  Date: Jun 18, 2024    Patient Name: Lore Oleary  MRN: [id number]    Consult requested by: Chari Ricketts    CHIEF COMPLAINT: right wrist fracture    Dominant Hand: right  Occupation: Retired      HPI:  Ms. Lore Oleary is a 69 year old female right hand dominant who presents with right wrist fracture. Injury occurred when patient fell on drive way on 6/12/24. Patient was seen at  2 days following the injury at which time XR were taken and she was provided a wrist brace. She takes Tylenol once daily for pain. Her current pain level is 7/10, 8/10 at worst. She feels the swelling has gone down.  No numbness or tingling.    History of left wrist fracture and surgery    PMH  Diabetes: no  Thyroid Problems: unsure  Smoking: former, quite 5 years ago      PAST MEDICAL HISTORY:  Past Medical History:   Diagnosis Date     Patient denies medical problems        PAST SURGICAL HISTORY:  Past Surgical History:   Procedure Laterality Date     FRACTURE TX, WRIST RT/LT         MEDICATIONS:  No current outpatient medications on file.     Current Facility-Administered Medications   Medication Dose Route Frequency Provider Last Rate Last Admin     acetaminophen (TYLENOL) tablet 1,000 mg  1,000 mg Oral Once Thomas De La Rosa MD         lidocaine 1% with EPINEPHrine 1:100,000 injection 3 mL  3 mL Intradermal Once Anne Santa MD           ALLERGIES:   No Known Allergies    FAMILY HISTORY:  No pertinent family history    SOCIAL HISTORY:  Social History     Tobacco Use     Smoking status: Former     Types: Cigarettes     Smokeless tobacco: Never   Vaping Use     Vaping status: Never Used       The patient's  "past medical, family, and social history was reviewed and confirmed.    REVIEW OF SYMPTOMS:      General: Negative   Eyes: Negative   Ear, Nose and Throat: Negative   Respiratory: Negative   Cardiovascular: Negative   Gastrointestinal: Negative   Genito-urinary: Negative   Musculoskeletal: see HPI  Neurological: Negative   Psychological: Negative  HEME: Negative   ENDO: Negative   SKIN: Negative    VITALS:  Vitals:    06/18/24 1402   BP: 126/67   Weight: 46.7 kg (103 lb)   Height: 1.6 m (5' 3\")       EXAM:  General: NAD, A&Ox3  HEENT: NC/AT  CV: RRR by peripheral pulse  Pulmonary: Non-labored breathing on RA  RUE:  Visible deformity, mild swelling and bruising about the right distal radius  Tenderness to palpation  Range of motion deferred  Intact EPL, FPL, hand intrinsics  Sensation intact to light touch all distributions   warm well-perfused, capillary fill less than 2 seconds      IMAGING:    X-rays of the right wrist obtained today compared to those from 6/14/2024 demonstrate an intra-articular distal radius fracture with dorsal angulation.  Joint surfaces overall concentric and well-preserved.  However there is interval increase in angulation compared to prior films    I have personally reviewed the above images and labs.       IMPRESSION AND RECOMMENDATIONS:  Ms. Lore Oleary is a 69 year old female right hand dominant with right distal radius fracture.    We discussed the diagnosis, prognosis, and treatment options today.  We discussed both operative and nonoperative treatment options.  Fracture pattern is amenable to nonoperative treatment.  The patient wishes to proceed with closed reduction and casting.    After obtaining written consent, I cleansed the skin over the dorsal wrist with chlorhexidine.  I then excised skin with ethyl chloride spray after which I injected 10 cc 1% lidocaine into the fracture site for hematoma block.  After adequate anesthesia was obtained, traction was then applied to " the wrist and the fracture was manually reduced with extension traction, followed by hyperflexion.  A cast with a dorsal mold was then placed.    Postreduction x-rays demonstrates improved alignment.    Follow-up next week x-rays of the right wrist in cast.    Nonweightbearing x 6 weeks.  All questions appear the patient voiced understanding and agreement with this plan.    Cast/splint application    Date/Time: 6/18/2024 3:06 PM    Performed by: Barry Welch MD  Authorized by: Barry Welch MD    Consent:     Consent obtained:  Verbal    Consent given by:  Patient    Alternatives discussed:  No treatment  Pre-procedure details:     Sensation:  Normal  Procedure details:     Laterality:  Right    Location:  Wrist    Wrist:  R wrist    Cast type:  Short arm    Supplies:  Fiberglass  Post-procedure details:     Pain:  Unchanged    Sensation:  Normal    Patient tolerance of procedure:  Tolerated well, no immediate complications    Patient provided with cast or splint care instructions: Yes    Medium Joint Injection/Arthrocentesis    Date/Time: 6/18/2024 3:40 PM    Performed by: Chari Ricketts PA-C  Authorized by: Chari Ricketts PA-C    Needle Size:  25 G  Guidance: surface landmarks    Approach:  Dorsal  Location:  Wrist  Location comment:  Right wrist   Medications:  10 mL lidocaine 1 %  Outcome:  Tolerated well, no immediate complications  Procedure discussed: discussed risks, benefits, and alternatives    Consent Given by:  Patient  Timeout: timeout called immediately prior to procedure    Prep: patient was prepped and draped in usual sterile fashion        Barry Welch MD    Hand, Upper Extremity & Microvascular Surgery  Department of Orthopaedic Surgery  [facility]           Again, thank you for allowing me to participate in the care of your patient.        Sincerely,        Barry Welch MD

## 2024-06-18 NOTE — PROGRESS NOTES
Orthopaedic Surgery Hand and Upper Extremity Clinic H&P Note:  Date: Jun 18, 2024    Patient Name: Lore Oleary  MRN: [id number]    Consult requested by: hCari Ricketts    CHIEF COMPLAINT: right wrist fracture    Dominant Hand: right  Occupation: Retired      HPI:  Ms. Lore Oleary is a 69 year old female right hand dominant who presents with right wrist fracture. Injury occurred when patient fell on drive way on 6/12/24. Patient was seen at  2 days following the injury at which time XR were taken and she was provided a wrist brace. She takes Tylenol once daily for pain. Her current pain level is 7/10, 8/10 at worst. She feels the swelling has gone down.  No numbness or tingling.    History of left wrist fracture and surgery    PMH  Diabetes: no  Thyroid Problems: unsure  Smoking: former, quite 5 years ago      PAST MEDICAL HISTORY:  Past Medical History:   Diagnosis Date    Patient denies medical problems        PAST SURGICAL HISTORY:  Past Surgical History:   Procedure Laterality Date    FRACTURE TX, WRIST RT/LT         MEDICATIONS:  No current outpatient medications on file.     Current Facility-Administered Medications   Medication Dose Route Frequency Provider Last Rate Last Admin    acetaminophen (TYLENOL) tablet 1,000 mg  1,000 mg Oral Once Thomas De La Rosa MD        lidocaine 1% with EPINEPHrine 1:100,000 injection 3 mL  3 mL Intradermal Once Anne Santa MD           ALLERGIES:   No Known Allergies    FAMILY HISTORY:  No pertinent family history    SOCIAL HISTORY:  Social History     Tobacco Use    Smoking status: Former     Types: Cigarettes    Smokeless tobacco: Never   Vaping Use    Vaping status: Never Used       The patient's past medical, family, and social history was reviewed and confirmed.    REVIEW OF SYMPTOMS:      General: Negative   Eyes: Negative   Ear, Nose and Throat: Negative   Respiratory: Negative   Cardiovascular: Negative   Gastrointestinal: Negative   Genito-urinary:  "Negative   Musculoskeletal: see HPI  Neurological: Negative   Psychological: Negative  HEME: Negative   ENDO: Negative   SKIN: Negative    VITALS:  Vitals:    06/18/24 1402   BP: 126/67   Weight: 46.7 kg (103 lb)   Height: 1.6 m (5' 3\")       EXAM:  General: NAD, A&Ox3  HEENT: NC/AT  CV: RRR by peripheral pulse  Pulmonary: Non-labored breathing on RA  RUE:  Visible deformity, mild swelling and bruising about the right distal radius  Tenderness to palpation  Range of motion deferred  Intact EPL, FPL, hand intrinsics  Sensation intact to light touch all distributions   warm well-perfused, capillary fill less than 2 seconds      IMAGING:    X-rays of the right wrist obtained today compared to those from 6/14/2024 demonstrate an intra-articular distal radius fracture with dorsal angulation.  Joint surfaces overall concentric and well-preserved.  However there is interval increase in angulation compared to prior films    I have personally reviewed the above images and labs.       IMPRESSION AND RECOMMENDATIONS:  Ms. Lore Oleary is a 69 year old female right hand dominant with right distal radius fracture.    We discussed the diagnosis, prognosis, and treatment options today.  We discussed both operative and nonoperative treatment options.  Fracture pattern is amenable to nonoperative treatment.  The patient wishes to proceed with closed reduction and casting.    After obtaining written consent, I cleansed the skin over the dorsal wrist with chlorhexidine.  I then excised skin with ethyl chloride spray after which I injected 10 cc 1% lidocaine into the fracture site for hematoma block.  After adequate anesthesia was obtained, traction was then applied to the wrist and the fracture was manually reduced with extension traction, followed by hyperflexion.  A cast with a dorsal mold was then placed.    Postreduction x-rays demonstrates improved alignment.    Follow-up next week x-rays of the right wrist in " cast.    Nonweightbearing x 6 weeks.  All questions appear the patient voiced understanding and agreement with this plan.    Cast/splint application    Date/Time: 6/18/2024 3:06 PM    Performed by: Barry Welch MD  Authorized by: Barry Welch MD    Consent:     Consent obtained:  Verbal    Consent given by:  Patient    Alternatives discussed:  No treatment  Pre-procedure details:     Sensation:  Normal  Procedure details:     Laterality:  Right    Location:  Wrist    Wrist:  R wrist    Cast type:  Short arm    Supplies:  Fiberglass  Post-procedure details:     Pain:  Unchanged    Sensation:  Normal    Patient tolerance of procedure:  Tolerated well, no immediate complications    Patient provided with cast or splint care instructions: Yes    Medium Joint Injection/Arthrocentesis    Date/Time: 6/18/2024 3:40 PM    Performed by: Chari Ricketts PA-C  Authorized by: Chari Ricketts PA-C    Needle Size:  25 G  Guidance: surface landmarks    Approach:  Dorsal  Location:  Wrist  Location comment:  Right wrist   Medications:  10 mL lidocaine 1 %  Outcome:  Tolerated well, no immediate complications  Procedure discussed: discussed risks, benefits, and alternatives    Consent Given by:  Patient  Timeout: timeout called immediately prior to procedure    Prep: patient was prepped and draped in usual sterile fashion        Barry Welch MD    Hand, Upper Extremity & Microvascular Surgery  Department of Orthopaedic Surgery  [facility]

## 2024-06-20 NOTE — PATIENT INSTRUCTIONS
Hub to relay    I thought he is going to a assisted living or nursing home place?  Do they go see them there also    Thank you for choosing [facility]    Dr. Welch Locations:    [facility]   [id number] [place] [address], [address]  [place] [address] [address] [place] [address]   Appointments: [phone] Appointments: [phone]   Fax: [phone] Fax: [phone]     Follow up: next week        Caring for Your Cast     A cast is used to protect an injured body part and allow it to heal by limiting the amount of motion occurring around the injury. Pain and swelling of the injured area is normal for 48 hours after your cast is put on. If you have swelling, wiggle your toes or fingers to ease it. Doing so encourages blood flow to your arm or leg.     It is important that you keep your cast dry, unless your doctor tells you differently. If the padding of the cast gets wet, your skin may be damaged and become infected. When showering or taking a bath, put the cast in a heavy plastic bag that can be held in place with a rubber band. If your cast gets wet and does not dry out in four to five hours, call your doctor s office.   To keep the cast clean, use wash clothes or baby wipes around it.   You may experience some itching inside the cast. This is normal. Avoid putting anything in the cast, even your finger, as you can injure your skin and cause infection. Try shaking some talcum powder or blowing cool air from a hair dryer into the cast to ease itching.   If these signs or symptoms develop, call your doctor immediately.      Pain gets worse    Swelling that cuts off blood flow that does not go away, even when you lift the body part above the level of your heart    Fever after itching. It may be related to an infection.    Fluid draining from your skin under the cast     Your cast may become loose as swelling goes down. If the cast feels too loose or if it is so loose you can take it off, call your doctor s  office.     Your doctor or  will give you recommendations for activity based on your injury. Some sports allow casts if properly padded by a doctor or .     For complete healing, your cast should only be removed at the direction of your doctor or clinic staff. A special saw ensures its safe removal and protects the skin and other tissue under the cast.         Please call [phone] to schedule your follow up appointment.

## 2024-06-25 ENCOUNTER — OFFICE VISIT (OUTPATIENT)
Dept: ORTHOPEDICS | Facility: CLINIC | Age: 69
End: 2024-06-25
Payer: COMMERCIAL

## 2024-06-25 ENCOUNTER — ANCILLARY PROCEDURE (OUTPATIENT)
Dept: GENERAL RADIOLOGY | Facility: CLINIC | Age: 69
End: 2024-06-25
Attending: STUDENT IN AN ORGANIZED HEALTH CARE EDUCATION/TRAINING PROGRAM
Payer: COMMERCIAL

## 2024-06-25 VITALS
SYSTOLIC BLOOD PRESSURE: 120 MMHG | BODY MASS INDEX: 18.25 KG/M2 | DIASTOLIC BLOOD PRESSURE: 72 MMHG | HEIGHT: 63 IN | WEIGHT: 103 LBS

## 2024-06-25 DIAGNOSIS — S52.571A OTHER CLOSED INTRA-ARTICULAR FRACTURE OF DISTAL END OF RIGHT RADIUS, INITIAL ENCOUNTER: ICD-10-CM

## 2024-06-25 DIAGNOSIS — S52.571A OTHER CLOSED INTRA-ARTICULAR FRACTURE OF DISTAL END OF RIGHT RADIUS, INITIAL ENCOUNTER: Primary | ICD-10-CM

## 2024-06-25 PROCEDURE — 99024 POSTOP FOLLOW-UP VISIT: CPT | Performed by: STUDENT IN AN ORGANIZED HEALTH CARE EDUCATION/TRAINING PROGRAM

## 2024-06-25 PROCEDURE — 73110 X-RAY EXAM OF WRIST: CPT | Mod: TC | Performed by: RADIOLOGY

## 2024-06-25 NOTE — LETTER
6/25/2024      Lore Oleary  [address]  [facility] [address]      Dear Colleague,    Thank you for referring your patient, Lore Oleary, to the [facility] [place]. Please see a copy of my visit note below.    Orthopaedic Surgery Hand and Upper Extremity Clinic Progress Note  Date: Jun 25, 2024    Patient Name: Lore Oleary  MRN: [id number]    Consult requested by: Chari Ricketts    CHIEF COMPLAINT: right wrist fracture    Dominant Hand: right  Occupation: Retired    6/25/2024: She reports pain has slightly decreased since last visit.  She takes 1 tylenol per day for pain.  Current pain level is 5/10.  Otherwise no noted changes.    HPI:  Ms. Lore Oleary is a 69 year old female right hand dominant who presents with right wrist fracture. Injury occurred when patient fell on drive way on 6/12/24. Patient was seen at  2 days following the injury at which time XR were taken and she was provided a wrist brace. She takes Tylenol once daily for pain. Her current pain level is 7/10, 8/10 at worst. She feels the swelling has gone down.  No numbness or tingling.      History of left wrist fracture and surgery    PMH  Diabetes: no  Thyroid Problems: unsure  Smoking: former, quite 5 years ago      VITALS:  There were no vitals filed for this visit.      EXAM:  General: NAD, A&Ox3  HEENT: NC/AT  CV: RRR by peripheral pulse  Pulmonary: Non-labored breathing on RA  RUE:  Short arm cast clean, intact, well-fitting  Intact EPL, FPL, hand intrinsics, able to move fingers appropriately within the confines of the cast.  Sensation intact to light touch all distributions   warm well-perfused, capillary fill less than 2 seconds      IMAGING:    X-rays of the right wrist obtained today compared to those from 6/14/2024 demonstrate an intra-articular distal radius fracture status post closed reduction and casting.  reduction is maintained.  No change in alignment.  joint surfaces  overall concentric and well-preserved.    I have personally reviewed the above images and labs.       IMPRESSION AND RECOMMENDATIONS:  Ms. Lore Oleary is a 69 year old female right hand dominant with right distal radius fracture being treated nonoperatively    Patient underwent closed reduction and casting at last visit.  X-rays today demonstrate maintained alignment.  Fracture remains amenable to nonoperative treatment.    Follow-up next July 30 with x-rays of the right wrist out of cast.  Patient will need to see hand therapy at that time to begin range of motion.    Nonweightbearing x 6 weeks.  All questions appear the patient voiced understanding and agreement with this plan.    Barry Welch MD    Hand, Upper Extremity & Microvascular Surgery  Department of Orthopaedic Surgery  [facility]           Again, thank you for allowing me to participate in the care of your patient.        Sincerely,        Barry Welch MD

## 2024-06-25 NOTE — PROGRESS NOTES
Orthopaedic Surgery Hand and Upper Extremity Clinic Progress Note  Date: Jun 25, 2024    Patient Name: Lore Oleary  MRN: [id number]    Consult requested by: Chari Ricketts    CHIEF COMPLAINT: right wrist fracture    Dominant Hand: right  Occupation: Retired    6/25/2024: She reports pain has slightly decreased since last visit.  She takes 1 tylenol per day for pain.  Current pain level is 5/10.  Otherwise no noted changes.    HPI:  Ms. Lore Oleary is a 69 year old female right hand dominant who presents with right wrist fracture. Injury occurred when patient fell on drive way on 6/12/24. Patient was seen at  2 days following the injury at which time XR were taken and she was provided a wrist brace. She takes Tylenol once daily for pain. Her current pain level is 7/10, 8/10 at worst. She feels the swelling has gone down.  No numbness or tingling.      History of left wrist fracture and surgery    PMH  Diabetes: no  Thyroid Problems: unsure  Smoking: former, quite 5 years ago      VITALS:  There were no vitals filed for this visit.      EXAM:  General: NAD, A&Ox3  HEENT: NC/AT  CV: RRR by peripheral pulse  Pulmonary: Non-labored breathing on RA  RUE:  Short arm cast clean, intact, well-fitting  Intact EPL, FPL, hand intrinsics, able to move fingers appropriately within the confines of the cast.  Sensation intact to light touch all distributions   warm well-perfused, capillary fill less than 2 seconds      IMAGING:    X-rays of the right wrist obtained today compared to those from 6/14/2024 demonstrate an intra-articular distal radius fracture status post closed reduction and casting.  reduction is maintained.  No change in alignment.  joint surfaces overall concentric and well-preserved.    I have personally reviewed the above images and labs.       IMPRESSION AND RECOMMENDATIONS:  Ms. Lore Oleary is a 69 year old female right hand dominant with right distal radius fracture being treated  nonoperatively    Patient underwent closed reduction and casting at last visit.  X-rays today demonstrate maintained alignment.  Fracture remains amenable to nonoperative treatment.    Follow-up next July 30 with x-rays of the right wrist out of cast.  Patient will need to see hand therapy at that time to begin range of motion.    Nonweightbearing x 6 weeks.  All questions appear the patient voiced understanding and agreement with this plan.    Barry Welch MD    Hand, Upper Extremity & Microvascular Surgery  Department of Orthopaedic Surgery  [facility]

## 2024-07-30 ENCOUNTER — ANCILLARY PROCEDURE (OUTPATIENT)
Dept: GENERAL RADIOLOGY | Facility: CLINIC | Age: 69
End: 2024-07-30
Attending: STUDENT IN AN ORGANIZED HEALTH CARE EDUCATION/TRAINING PROGRAM
Payer: COMMERCIAL

## 2024-07-30 ENCOUNTER — OFFICE VISIT (OUTPATIENT)
Dept: ORTHOPEDICS | Facility: CLINIC | Age: 69
End: 2024-07-30
Payer: COMMERCIAL

## 2024-07-30 ENCOUNTER — THERAPY VISIT (OUTPATIENT)
Dept: OCCUPATIONAL THERAPY | Facility: CLINIC | Age: 69
End: 2024-07-30
Payer: COMMERCIAL

## 2024-07-30 DIAGNOSIS — M25.631 STIFFNESS OF RIGHT WRIST JOINT: ICD-10-CM

## 2024-07-30 DIAGNOSIS — S52.571D OTHER CLOSED INTRA-ARTICULAR FRACTURE OF DISTAL END OF RIGHT RADIUS WITH ROUTINE HEALING, SUBSEQUENT ENCOUNTER: Primary | ICD-10-CM

## 2024-07-30 DIAGNOSIS — M25.531 RIGHT WRIST PAIN: ICD-10-CM

## 2024-07-30 DIAGNOSIS — S52.571A OTHER CLOSED INTRA-ARTICULAR FRACTURE OF DISTAL END OF RIGHT RADIUS, INITIAL ENCOUNTER: ICD-10-CM

## 2024-07-30 PROCEDURE — 99024 POSTOP FOLLOW-UP VISIT: CPT | Performed by: STUDENT IN AN ORGANIZED HEALTH CARE EDUCATION/TRAINING PROGRAM

## 2024-07-30 PROCEDURE — 73110 X-RAY EXAM OF WRIST: CPT | Mod: TC | Performed by: RADIOLOGY

## 2024-07-30 PROCEDURE — 97165 OT EVAL LOW COMPLEX 30 MIN: CPT | Mod: GO | Performed by: OCCUPATIONAL THERAPIST

## 2024-07-30 PROCEDURE — 97110 THERAPEUTIC EXERCISES: CPT | Mod: GO | Performed by: OCCUPATIONAL THERAPIST

## 2024-07-30 ASSESSMENT — PAIN SCALES - GENERAL: PAINLEVEL: MILD PAIN (3)

## 2024-07-30 NOTE — PROGRESS NOTES
OCCUPATIONAL THERAPY EVALUATION  Type of Visit: Evaluation       Fall Risk Screen:  Fall screen completed by: OT  Have you fallen 2 or more times in the past year?: No  Have you fallen and had an injury in the past year?: Yes  Is patient a fall risk?: No    Subjective      Presenting condition or subjective complaint: right wrist  Date of onset: 06/12/24    Relevant medical history:     Past Medical History:   Diagnosis Date    Patient denies medical problems      Dates & types of surgery:    Past Surgical History:   Procedure Laterality Date    FRACTURE TX, WRIST RT/LT       Prior diagnostic imaging/testing results: X-ray; Other cast   Prior therapy history for the same diagnosis, illness or injury: No      Prior Level of Function  Transfers: Independent  Ambulation: Independent  ADL: Independent    Living Environment  Social support: With a significant other or spouse   Type of home: House; 2-story   Stairs to enter the home: Yes       Ramp: No   Stairs inside the home: Yes   Is there a railing: Yes     Help at home: None    Employment: No      Patient goals for therapy: various -recoveribg from broken wrist on dominant arm     Objective   ADDITIONAL HISTORY:  Right hand dominant  Patient reports symptoms of pain, stiffness/loss of motion, weakness/loss of strength, and edema  Transportation: drives  Currently retired    Functional Outcome Measure:   Upper Extremity Functional Index Score:  SCORE:   Column Totals: /80: 29   (A lower score indicates greater disability.)  PAIN:  Pain Level at Rest: 0/10  Pain Level with Use: 3/10  Pain Location: R wrist, unsure of exact location  Pain Quality: Aching  Pain Frequency: intermittent  Pain is Worst: daytime or nighttime  Pain is Exacerbated By: daily use  Pain is Relieved By: rest  Pain Progression: Improved    EDEMA:  mild in R wrist circumferentially      ROM:   Wrist ROM  Left AROM Right AROM    Extension 54 24   Flexion 77 34   Radial Deviation (RD) 17 17   Ulnar  Deviation (UD) 32 12   Supination 83 70   Pronation 86 74     Thumb ROM  Left AROM Right AROM    MP Joint 0/55 0/53   IP Joint  0/61 0/53   Radial Abduction 36 34   Palmar Abduction 42 42   Kapandji Opposition Scale (0-10/10) 10 9     Assessment & Plan   CLINICAL IMPRESSIONS  Medical Diagnosis: R DRF    Treatment Diagnosis: R wrist pain and stiffness    Impression/Assessment: Pt is a 69 year old female presenting to Occupational Therapy due to right distal radius fracture after fall on driveway.  The following significant findings have been identified: Impaired activity tolerance, Impaired ROM, Impaired strength, and Pain.  These identified deficits interfere with their ability to perform self care tasks, household chores, driving , and meal planning and preparation as compared to previous level of function.   Patient's limitations or Problem List includes: Pain, Decreased ROM/motion, Increased edema, Weakness, Decreased , Decreased pinch, Decreased coordination, and Tightness in musculature of the right wrist which interferes with the patient's ability to perform Self Care Tasks (dressing, eating, bathing, hygiene/toileting), Sleep Patterns, Recreational Activities, Household Chores, and Driving  as compared to previous level of function.    Clinical Decision Making (Complexity):  Assessment of Occupational Performance: 3-5 Performance Deficits  Occupational Performance Limitations: bathing/showering, toileting, dressing, driving and community mobility, home establishment and management, meal preparation and cleanup, sleep, and leisure activities  Clinical Decision Making (Complexity): Low complexity    PLAN OF CARE  Treatment Interventions:  Therapeutic Exercise:  AROM, PROM, Tendon Gliding, Place and Hold, Extensor Tracking, Isotonics, and Isometrics  Neuromuscular re-education:  Coordination/Dexterity, Kinesthetic Training, and Proprioceptive Training  Manual Techniques:  Joint mobilization, Myofascial  release, and Manual edema mobilization  Self Care:  Self Care Tasks and Ergonomic Considerations    Long Term Goals   OT Goal 1  Goal Identifier: Household Chores  Goal Description: Pt will be able to open a new jar without pain in order to maximize independence with ADLs  Target Date: 09/10/24      Frequency of Treatment: 1x/week  Duration of Treatment: 6 weeks     Recommended Referrals to Other Professionals:  none  Education Assessment: Learner/Method: Patient;No Barriers to Learning     Risks and benefits of evaluation/treatment have been explained.   Patient/Family/caregiver agrees with Plan of Care.     Evaluation Time:    OT Eval, Low Complexity Minutes (67899): 23   Present: Not applicable     Signing Clinician: Christin Coleman OT        [facility]                                                                                   OUTPATIENT OCCUPATIONAL THERAPY      PLAN OF TREATMENT FOR OUTPATIENT REHABILITATION   Patient's Last Name, First Name, JASONVIOLETA  AryaLore ospina YOB: 1955   Provider's Name   [facility]   Medical Record No.  [id number]     Onset Date: 06/12/24 Start of Care Date: 07/30/24     Medical Diagnosis:  R DRF      OT Treatment Diagnosis:  R wrist pain and stiffness Plan of Treatment  Frequency/Duration:1x/week/6 weeks    Certification date from 07/30/24   To 09/10/24        See note for plan of treatment details and functional goals     Christin Coleman OT                         I CERTIFY THE NEED FOR THESE SERVICES FURNISHED UNDER        THIS PLAN OF TREATMENT AND WHILE UNDER MY CARE     (Physician attestation of this document indicates review and certification of the therapy plan).              Referring Provider:  Barry Welch    Initial Assessment  See Epic Evaluation- 07/30/24

## 2024-07-30 NOTE — LETTER
7/30/2024      Lore Oleary  [address]  [facility] [address]      Dear Colleague,    Thank you for referring your patient, Lore Oleary, to the [facility] [place]. Please see a copy of my visit note below.    Orthopaedic Surgery Hand and Upper Extremity Clinic Progress Note  Date: Jul 30, 2024    Patient Name: Lore Oleary  MRN: [id number]    Consult requested by: Chari Ricketts    CHIEF COMPLAINT: right wrist fracture    Dominant Hand: right  Occupation: Retired    Interval 07/30/24  Patient returns today now having been casted for 6 weeks.  Doing well.  Happy to have the cast off.  Reports no pain.    HPI:  Ms. Lore Oleary is a 69 year old female right hand dominant who presents with right wrist fracture. Injury occurred when patient fell on drive way on 6/12/24. Patient was seen at  2 days following the injury at which time XR were taken and she was provided a wrist brace. She takes Tylenol once daily for pain. Her current pain level is 7/10, 8/10 at worst. She feels the swelling has gone down.  No numbness or tingling.      History of left wrist fracture and surgery    PMH  Diabetes: no  Thyroid Problems: unsure  Smoking: former, quite 5 years ago      VITALS:  There were no vitals filed for this visit.      EXAM:  General: NAD, A&Ox3  HEENT: NC/AT  CV: RRR by peripheral pulse  Pulmonary: Non-labored breathing on RA  RUE:  Skin intact  No deformity, no swelling, no tenderness  Intact EPL, FPL, hand intrinsics  Wrist flexion extension already good  Sensation intact to light touch all distributions   warm well-perfused, capillary fill less than 2 seconds      IMAGING:    X-rays of the right wrist obtained today compared to those from 6/14/2024 demonstrate an intra-articular distal radius fracture status post closed reduction and casting.  There has been mild interval dorsal impaction and increase in dorsal angulation, however alignment remains  acceptable and joint concentric.  Fracture is healing    I have personally reviewed the above images and labs.       IMPRESSION AND RECOMMENDATIONS:  Ms. Lore Oleary is a 69 year old female right hand dominant with right distal radius fracture treated nonoperatively.    Cast removed today.  Patient without any tenderness today.  Interval healing seen on radiographs.    She was provided with a removable brace.  She will see hand therapy today to begin range of motion and to obtain a home exercise program.  The patient prefers to work on range of motion at home rather than come to OT.  The patient may begin gentle activities and progressive weightbearing at home using the brace.  Okay to progress if no pain.    All questions appear the patient voiced understanding and agreement.  Return precautions discussed.  Follow-up with me in 6 of the right wrist.      Barry Welch MD    Hand, Upper Extremity & Microvascular Surgery  Department of Orthopaedic Surgery  [facility]           Again, thank you for allowing me to participate in the care of your patient.        Sincerely,        Barry Welch MD

## 2024-07-31 NOTE — PROGRESS NOTES
Orthopaedic Surgery Hand and Upper Extremity Clinic Progress Note  Date: Jul 30, 2024    Patient Name: Lore Oleary  MRN: [id number]    Consult requested by: Chari Ricketts    CHIEF COMPLAINT: right wrist fracture    Dominant Hand: right  Occupation: Retired    Interval 07/30/24  Patient returns today now having been casted for 6 weeks.  Doing well.  Happy to have the cast off.  Reports no pain.    HPI:  Ms. Lore Oleary is a 69 year old female right hand dominant who presents with right wrist fracture. Injury occurred when patient fell on drive way on 6/12/24. Patient was seen at  2 days following the injury at which time XR were taken and she was provided a wrist brace. She takes Tylenol once daily for pain. Her current pain level is 7/10, 8/10 at worst. She feels the swelling has gone down.  No numbness or tingling.      History of left wrist fracture and surgery    PMH  Diabetes: no  Thyroid Problems: unsure  Smoking: former, quite 5 years ago      VITALS:  There were no vitals filed for this visit.      EXAM:  General: NAD, A&Ox3  HEENT: NC/AT  CV: RRR by peripheral pulse  Pulmonary: Non-labored breathing on RA  RUE:  Skin intact  No deformity, no swelling, no tenderness  Intact EPL, FPL, hand intrinsics  Wrist flexion extension already good  Sensation intact to light touch all distributions   warm well-perfused, capillary fill less than 2 seconds      IMAGING:    X-rays of the right wrist obtained today compared to those from 6/14/2024 demonstrate an intra-articular distal radius fracture status post closed reduction and casting.  There has been mild interval dorsal impaction and increase in dorsal angulation, however alignment remains acceptable and joint concentric.  Fracture is healing    I have personally reviewed the above images and labs.       IMPRESSION AND RECOMMENDATIONS:  Ms. Lore Oleary is a 69 year old female right hand dominant with right distal radius fracture treated  nonoperatively.    Cast removed today.  Patient without any tenderness today.  Interval healing seen on radiographs.    She was provided with a removable brace.  She will see hand therapy today to begin range of motion and to obtain a home exercise program.  The patient prefers to work on range of motion at home rather than come to OT.  The patient may begin gentle activities and progressive weightbearing at home using the brace.  Okay to progress if no pain.    All questions appear the patient voiced understanding and agreement.  Return precautions discussed.  Follow-up with me in 6 of the right wrist.      Barry Welch MD    Hand, Upper Extremity & Microvascular Surgery  Department of Orthopaedic Surgery  [facility]

## 2024-08-04 PROBLEM — M25.531 RIGHT WRIST PAIN: Status: ACTIVE | Noted: 2024-08-04

## 2024-08-04 PROBLEM — M25.631 STIFFNESS OF RIGHT WRIST JOINT: Status: ACTIVE | Noted: 2024-08-04

## 2024-08-04 PROBLEM — S52.571A OTHER CLOSED INTRA-ARTICULAR FRACTURE OF DISTAL END OF RIGHT RADIUS, INITIAL ENCOUNTER: Status: ACTIVE | Noted: 2024-08-04

## 2024-09-10 ENCOUNTER — OFFICE VISIT (OUTPATIENT)
Dept: ORTHOPEDICS | Facility: CLINIC | Age: 69
End: 2024-09-10
Payer: COMMERCIAL

## 2024-09-10 ENCOUNTER — ANCILLARY PROCEDURE (OUTPATIENT)
Dept: GENERAL RADIOLOGY | Facility: CLINIC | Age: 69
End: 2024-09-10
Attending: STUDENT IN AN ORGANIZED HEALTH CARE EDUCATION/TRAINING PROGRAM
Payer: COMMERCIAL

## 2024-09-10 VITALS — WEIGHT: 104 LBS | BODY MASS INDEX: 18.43 KG/M2 | HEIGHT: 63 IN

## 2024-09-10 DIAGNOSIS — S52.571D OTHER CLOSED INTRA-ARTICULAR FRACTURE OF DISTAL END OF RIGHT RADIUS WITH ROUTINE HEALING, SUBSEQUENT ENCOUNTER: ICD-10-CM

## 2024-09-10 DIAGNOSIS — S52.571D OTHER CLOSED INTRA-ARTICULAR FRACTURE OF DISTAL END OF RIGHT RADIUS WITH ROUTINE HEALING, SUBSEQUENT ENCOUNTER: Primary | ICD-10-CM

## 2024-09-10 PROCEDURE — 73110 X-RAY EXAM OF WRIST: CPT | Mod: TC | Performed by: STUDENT IN AN ORGANIZED HEALTH CARE EDUCATION/TRAINING PROGRAM

## 2024-09-10 PROCEDURE — 99213 OFFICE O/P EST LOW 20 MIN: CPT | Performed by: STUDENT IN AN ORGANIZED HEALTH CARE EDUCATION/TRAINING PROGRAM

## 2024-09-10 NOTE — PROGRESS NOTES
Orthopaedic Surgery Hand and Upper Extremity Clinic Progress Note  Date: Sep 10, 2024    Patient Name: Lore Oleary  MRN: [id number]    Consult requested by: Chari Ricketts    CHIEF COMPLAINT: right wrist fracture    Dominant Hand: right  Occupation: Retired    Update 9/10/24: She states that she get the occasional twinges of pain, but overall is doing well.  Range of motion and strength are improving    Interval 07/30/24  Patient returns today now having been casted for 6 weeks.  Doing well.  Happy to have the cast off.  Reports no pain.    HPI:  Ms. Lore Oleary is a 69 year old female right hand dominant who presents with right wrist fracture. Injury occurred when patient fell on drive way on 6/12/24. Patient was seen at  2 days following the injury at which time XR were taken and she was provided a wrist brace. She takes Tylenol once daily for pain. Her current pain level is 7/10, 8/10 at worst. She feels the swelling has gone down.  No numbness or tingling.      History of left wrist fracture and surgery    PMH  Diabetes: no  Thyroid Problems: unsure  Smoking: former, quite 5 years ago      VITALS:  There were no vitals filed for this visit.      EXAM:  General: NAD, A&Ox3  HEENT: NC/AT  CV: RRR by peripheral pulse  Pulmonary: Non-labored breathing on RA  RUE:  Skin intact  Mild residual deformity, prominent ulna, loss of radial inclination no swelling, no tenderness  Mildly diminished extension, near full flexion compared to contralateral  Full pronation supination   no ulnar-sided wrist pain  Intact EPL, FPL, hand intrinsics  Wrist flexion extension already good  Sensation intact to light touch all distributions   warm well-perfused, capillary fill less than 2 seconds      IMAGING:    X-rays of the right wrist obtained today compared to those from from previous demonstrate an intra-articular distal radius fracture status post closed reduction and casting.  There is mild residual dorsal impaction  and and dorsal angulation.  However fracture is healed. No change from previous    I have personally reviewed the above images and labs.       IMPRESSION AND RECOMMENDATIONS:  Ms. Lore Oleary is a 69 year old female right hand dominant with right distal radius fracture treated nonoperatively.    Fracture is healed.  Range of motion is excellent.  Patient may weight-bear as tolerated without restrictions.    All questions answered.  The patient voiced understanding and agreement, follow-up with me as needed.    Barry Welch MD    Hand, Upper Extremity & Microvascular Surgery  Department of Orthopaedic Surgery  [facility]

## 2024-09-10 NOTE — LETTER
9/10/2024      Lore Oleary  [address]  [facility] [address]      Dear Colleague,    Thank you for referring your patient, Lore Oleary, to the [facility]TH[facility] [place]. Please see a copy of my visit note below.    Orthopaedic Surgery Hand and Upper Extremity Clinic Progress Note  Date: Sep 10, 2024    Patient Name: Lore Oleary  MRN: [id number]    Consult requested by: Chari Ricketts    CHIEF COMPLAINT: right wrist fracture    Dominant Hand: right  Occupation: Retired    Update 9/10/24: She states that she get the occasional twinges of pain, but overall is doing well.  Range of motion and strength are improving    Interval 07/30/24  Patient returns today now having been casted for 6 weeks.  Doing well.  Happy to have the cast off.  Reports no pain.    HPI:  Ms. Lore Oleary is a 69 year old female right hand dominant who presents with right wrist fracture. Injury occurred when patient fell on drive way on 6/12/24. Patient was seen at  2 days following the injury at which time XR were taken and she was provided a wrist brace. She takes Tylenol once daily for pain. Her current pain level is 7/10, 8/10 at worst. She feels the swelling has gone down.  No numbness or tingling.      History of left wrist fracture and surgery    PMH  Diabetes: no  Thyroid Problems: unsure  Smoking: former, quite 5 years ago      VITALS:  There were no vitals filed for this visit.      EXAM:  General: NAD, A&Ox3  HEENT: NC/AT  CV: RRR by peripheral pulse  Pulmonary: Non-labored breathing on RA  RUE:  Skin intact  Mild residual deformity, prominent ulna, loss of radial inclination no swelling, no tenderness  Mildly diminished extension, near full flexion compared to contralateral  Full pronation supination   no ulnar-sided wrist pain  Intact EPL, FPL, hand intrinsics  Wrist flexion extension already good  Sensation intact to light touch all distributions   warm well-perfused,  capillary fill less than 2 seconds      IMAGING:    X-rays of the right wrist obtained today compared to those from from previous demonstrate an intra-articular distal radius fracture status post closed reduction and casting.  There is mild residual dorsal impaction and and dorsal angulation.  However fracture is healed. No change from previous    I have personally reviewed the above images and labs.       IMPRESSION AND RECOMMENDATIONS:  Ms. Lore Oleary is a 69 year old female right hand dominant with right distal radius fracture treated nonoperatively.    Fracture is healed.  Range of motion is excellent.  Patient may weight-bear as tolerated without restrictions.    All questions answered.  The patient voiced understanding and agreement, follow-up with me as needed.    Barry Welch MD    Hand, Upper Extremity & Microvascular Surgery  Department of Orthopaedic Surgery  [facility]           Again, thank you for allowing me to participate in the care of your patient.        Sincerely,        Barry Welch MD

## 2024-09-13 PROBLEM — M25.531 RIGHT WRIST PAIN: Status: RESOLVED | Noted: 2024-08-04 | Resolved: 2024-09-13

## 2024-09-13 PROBLEM — M25.631 STIFFNESS OF RIGHT WRIST JOINT: Status: RESOLVED | Noted: 2024-08-04 | Resolved: 2024-09-13

## 2024-09-13 PROBLEM — S52.571A OTHER CLOSED INTRA-ARTICULAR FRACTURE OF DISTAL END OF RIGHT RADIUS, INITIAL ENCOUNTER: Status: RESOLVED | Noted: 2024-08-04 | Resolved: 2024-09-13

## 2025-01-02 DIAGNOSIS — S52.501D CLOSED FRACTURE OF DISTAL END OF RIGHT RADIUS WITH ROUTINE HEALING, UNSPECIFIED FRACTURE MORPHOLOGY, SUBSEQUENT ENCOUNTER: ICD-10-CM

## 2025-01-02 DIAGNOSIS — Z87.311 HISTORY OF FRAGILITY FRACTURE: Primary | ICD-10-CM

## 2025-02-18 ENCOUNTER — OFFICE VISIT (OUTPATIENT)
Dept: FAMILY MEDICINE | Facility: CLINIC | Age: 70
End: 2025-02-18
Payer: COMMERCIAL

## 2025-02-18 VITALS
DIASTOLIC BLOOD PRESSURE: 74 MMHG | HEIGHT: 62 IN | HEART RATE: 73 BPM | SYSTOLIC BLOOD PRESSURE: 111 MMHG | TEMPERATURE: 97.3 F | RESPIRATION RATE: 18 BRPM | WEIGHT: 103 LBS | OXYGEN SATURATION: 100 % | BODY MASS INDEX: 18.95 KG/M2

## 2025-02-18 DIAGNOSIS — E21.0 PRIMARY HYPERPARATHYROIDISM: ICD-10-CM

## 2025-02-18 DIAGNOSIS — Z01.818 PREOP GENERAL PHYSICAL EXAM: ICD-10-CM

## 2025-02-18 DIAGNOSIS — M81.0 OSTEOPOROSIS, UNSPECIFIED OSTEOPOROSIS TYPE, UNSPECIFIED PATHOLOGICAL FRACTURE PRESENCE: ICD-10-CM

## 2025-02-18 DIAGNOSIS — M25.512 ACUTE PAIN OF LEFT SHOULDER: ICD-10-CM

## 2025-02-18 PROBLEM — E83.52 HIGH CALCIUM LEVELS: Status: RESOLVED | Noted: 2021-01-21 | Resolved: 2025-02-18

## 2025-02-18 LAB
ANION GAP SERPL CALCULATED.3IONS-SCNC: 17 MMOL/L (ref 7–15)
BUN SERPL-MCNC: 13.3 MG/DL (ref 8–23)
CALCIUM SERPL-MCNC: 13.3 MG/DL (ref 8.8–10.4)
CHLORIDE SERPL-SCNC: 96 MMOL/L (ref 98–107)
CREAT SERPL-MCNC: 0.82 MG/DL (ref 0.51–0.95)
EGFRCR SERPLBLD CKD-EPI 2021: 77 ML/MIN/1.73M2
GLUCOSE SERPL-MCNC: 82 MG/DL (ref 70–99)
HCO3 SERPL-SCNC: 23 MMOL/L (ref 22–29)
HGB BLD-MCNC: 12.8 G/DL (ref 11.7–15.7)
POTASSIUM SERPL-SCNC: 4.5 MMOL/L (ref 3.4–5.3)
SODIUM SERPL-SCNC: 136 MMOL/L (ref 135–145)

## 2025-02-18 PROCEDURE — 36415 COLL VENOUS BLD VENIPUNCTURE: CPT | Performed by: INTERNAL MEDICINE

## 2025-02-18 PROCEDURE — 85018 HEMOGLOBIN: CPT | Performed by: INTERNAL MEDICINE

## 2025-02-18 PROCEDURE — 99214 OFFICE O/P EST MOD 30 MIN: CPT | Performed by: INTERNAL MEDICINE

## 2025-02-18 PROCEDURE — 80048 BASIC METABOLIC PNL TOTAL CA: CPT | Performed by: INTERNAL MEDICINE

## 2025-02-18 RX ORDER — MULTIVITAMIN
1 TABLET ORAL DAILY
COMMUNITY
Start: 2025-02-18

## 2025-02-18 NOTE — PATIENT INSTRUCTIONS
How to Take Your Medication Before Surgery  Preoperative Medication Instructions   Antiplatelet or Anticoagulation Medication Instructions   - We reviewed the medication list and the patient is not on an antiplatelet or anticoagulation medications.    Additional Medication Instructions  We reviewed the medication list and there are no chronic medications that need to be adjusted for this procedure.   - Herbal medications and vitamins: DO NOT TAKE 14 days prior to surgery.   - ibuprofen (Advil, Motrin): DO NOT TAKE 1 day before surgery.      -  Ok to take acetaminophen (Tylenol) as needed for pain    Patient Education   Preparing for Your Surgery  For Adults  Getting started  In most cases, a nurse will call to review your health history and instructions. They will give you an arrival time based on your scheduled surgery time. Please be ready to share:  Your doctor's clinic name and phone number  Your medical, surgical, and anesthesia history  A list of allergies and sensitivities  A list of medicines, including herbal treatments and over-the-counter drugs  Whether the patient has a legal guardian (ask how to send us the papers in advance)  Note: You may not receive a call if you were seen at our PAC (Preoperative Assessment Center).  Please tell us if you're pregnant--or if there's any chance you might be pregnant. Some surgeries may injure a fetus (unborn baby), so they require a pregnancy test. Surgeries that are safe for a fetus don't always need a test, and you can choose whether to have one.   Preparing for surgery  Within 10 to 30 days of surgery: Have a pre-op exam (sometimes called an H&P, or History and Physical). This can be done at a clinic or pre-operative center.  If you're having a , you may not need this exam. Talk to your care team.  At your pre-op exam, talk to your care team about all medicines you take. (This includes CBD oil and any drugs, such as THC, marijuana, and other forms of  cannabis.) If you need to stop any medicine before surgery, ask when to start taking it again.  This is for your safety. Many medicines and drugs can make you bleed too much during surgery. Some change how well surgery (anesthesia) drugs work.  Call your insurance company to let them know you're having surgery. (If you don't have insurance, call [phone].)  Call your clinic if there's any change in your health. This includes a scrape or scratch near the surgery site, or any signs of a cold (sore throat, runny nose, cough, rash, fever).  Eating and drinking guidelines  For your safety: Unless your surgeon tells you otherwise, follow the guidelines below.  Eat and drink as normal until 8 hours before you arrive for surgery. After that, no food or milk. You can spit out gum when you arrive.  Drink clear liquids until 2 hours before you arrive. These are liquids you can see through, like water, Gatorade, and Propel Water. They also include plain black coffee and tea (no cream or milk).  No alcohol for 24 hours before you arrive. The night before surgery, stop any drinks that contain THC.  If your care team tells you to take medicine on the morning of surgery, it's okay to take it with a sip of water. No other medicines or drugs are allowed (including CBD oil)--follow your care team's instructions.  If you have questions the day of surgery, call your hospital or surgery center.   Preventing infection  Shower or bathe the night before and the morning of surgery. Follow the instructions your clinic gave you. (If no instructions, use regular soap.)  Don't shave or clip hair near your surgery site. We'll remove the hair if needed.  Don't smoke or vape the morning of surgery. No chewing tobacco for 6 hours before you arrive. A nicotine patch is okay. You may spit out nicotine gum when you arrive.  For some surgeries, the surgeon will tell you to fully quit smoking and nicotine.  We will make every effort to keep you safe  from infection. We will:  Clean our hands often with soap and water (or an alcohol-based hand rub).  Clean the skin at your surgery site with a special soap that kills germs.  Give you a special gown to keep you warm. (Cold raises the risk of infection.)  Wear hair covers, masks, gowns, and gloves during surgery.  Give antibiotic medicine, if prescribed. Not all surgeries need this medicine.  What to bring on the day of surgery  Photo ID and insurance card  Copy of your health care directive, if you have one  Glasses and hearing aids (bring cases)  You can't wear contacts during surgery  Inhaler and eye drops, if you use them (tell us about these when you arrive)  CPAP machine or breathing device, if you use them  A few personal items, if spending the night  If you have . . .  A pacemaker, ICD (cardiac defibrillator), or other implant: Bring the ID card.  An implanted stimulator: Bring the remote control.  A legal guardian: Bring a copy of the certified (court-stamped) guardianship papers.  Please remove any jewelry, including body piercings. Leave jewelry and other valuables at home.  If you're going home the day of surgery  You must have a responsible adult drive you home. They should stay with you overnight as well.  If you don't have someone to stay with you, and you aren't safe to go home alone, we may keep you overnight. Insurance often won't pay for this.  After surgery  If it's hard to control your pain or you need more pain medicine, please call your surgeon's office.  Questions?   If you have any questions for your care team, list them here:   ____________________________________________________________________________________________________________________________________________________________________________________________________________________________________________________________  For informational purposes only. Not to replace the advice of your health care provider. Copyright   2003, 2019  [facility]. All rights reserved. Clinically reviewed by Vishal Kemp MD. [employer] [id number] - REV 08/24.

## 2025-02-18 NOTE — PROGRESS NOTES
Preoperative Evaluation  [address]  [address]  [place] [address]  Phone: [phone]  Fax: [phone]  Primary Provider: Physician No Ref-Primary  Pre-op Performing Provider: Dennise Cerda DO  Feb 18, 2025 2/18/2025   Surgical Information   What procedure is being done? Left shoulder surgery   Facility or Hospital where procedure/surgery will be performed: [facility]   Who is doing the procedure / surgery? Dr. Preet Thompson   Date of surgery / procedure: February 19   Time of surgery / procedure: 0920   Where do you plan to recover after surgery? at home with family     Fax number for surgical facility: [phone]    Assessment & Plan     The proposed surgical procedure is considered INTERMEDIATE risk.    (Z01.818) Preop general physical exam  (M25.512) Acute pain of left shoulder  Comment: She is not certain the specifics of the procedure- is otherwise healthy with exception of osteoporosis (and likely hyperparathyroidism)- check labs today.  Ok to proceed with surgery.  Plan: Basic metabolic panel  (Ca, Cl, CO2, Creat,         Gluc, K, Na, BUN), Hemoglobin    (E21.0) Primary hyperparathyroidism  Comment: Noted in her history- check labs today.    (M81.0) Osteoporosis, unspecified osteoporosis type, unspecified pathological fracture presence  Comment: Demineralization noted on x-rays around wrist fractures- recommend DEXA; establish care with me.               - No identified additional risk factors other than previously addressed    Preoperative Medication Instructions  Antiplatelet or Anticoagulation Medication Instructions   - We reviewed the medication list and the patient is not on an antiplatelet or anticoagulation medications.    Additional Medication Instructions  We reviewed the medication list and there are no chronic medications that need to be adjusted for this procedure.   - Herbal medications and vitamins:  DO NOT TAKE 14 days prior to surgery.   - ibuprofen (Advil, Motrin): DO NOT TAKE 1 day before surgery.     Recommendation  Approval given to proceed with proposed procedure, without further diagnostic evaluation.    Grey Torrez is a 69 year old, presenting for the following:  Pre-Op Exam          2/18/2025     8:20 AM   Additional Questions   Roomed by jerel   Accompanied by self     HPI related to upcoming procedure:     Plan for left shoulder repair.         2/18/2025   Pre-Op Questionnaire   Have you ever had a heart attack or stroke? No   Have you ever had surgery on your heart or blood vessels, such as a stent placement, a coronary artery bypass, or surgery on an artery in your head, neck, heart, or legs? No   Do you have chest pain with activity? No   Do you have a history of heart failure? No   Do you currently have a cold, bronchitis or symptoms of other infection? No   Do you have a cough, shortness of breath, or wheezing? No   Do you or anyone in your family have previous history of blood clots? No   Do you or does anyone in your family have a serious bleeding problem such as prolonged bleeding following surgeries or cuts? No   Have you ever had problems with anemia or been told to take iron pills? No   Have you had any abnormal blood loss such as black, tarry or bloody stools, or abnormal vaginal bleeding? No   Have you ever had a blood transfusion? No   Are you willing to have a blood transfusion if it is medically needed before, during, or after your surgery? Yes   Have you or any of your relatives ever had problems with anesthesia? No   Do you have sleep apnea, excessive snoring or daytime drowsiness? No   Do you have any artifical heart valves or other implanted medical devices like a pacemaker, defibrillator, or continuous glucose monitor? No   Do you have artificial joints? No   Are you allergic to latex? No     Health Care Directive  Patient does not have a Health Care Directive: Discussed  "advance care planning with patient; information given to patient to review.    Preoperative Review of    reviewed - no record of controlled substances prescribed.      Patient Active Problem List    Diagnosis Date Noted    Primary hyperparathyroidism 02/02/2021     Priority: Medium    High serum low-density lipoprotein (LDL) 01/21/2021     Priority: Medium      Past Medical History:   Diagnosis Date    Patient denies medical problems      Past Surgical History:   Procedure Laterality Date    FRACTURE TX, WRIST RT/LT       Current Outpatient Medications   Medication Sig Dispense Refill    Calcium-Magnesium 200-100 MG TABS Take by mouth. Takes intermittently.      multivitamin (ONE-DAILY) tablet Take 1 tablet by mouth daily.         No Known Allergies     Social History     Tobacco Use    Smoking status: Former     Types: Cigarettes    Smokeless tobacco: Never    Tobacco comments:     Quit 2020   Substance Use Topics    Alcohol use: Yes     Comment: 1-2 beers most nights     Family History   Problem Relation Age of Onset    Heart Disease Father     Diabetes Sister      History   Drug Use Unknown               Objective    /74   Pulse 73   Temp 97.3  F (36.3  C) (Temporal)   Resp 18   Ht 1.585 m (5' 2.4\")   Wt 46.7 kg (103 lb)   SpO2 100%   BMI 18.60 kg/m     Estimated body mass index is 18.6 kg/m  as calculated from the following:    Height as of this encounter: 1.585 m (5' 2.4\").    Weight as of this encounter: 46.7 kg (103 lb).      Physical Exam  GENERAL: alert and no distress  EYES: Eyes grossly normal to inspection, PERRL and conjunctivae and sclerae normal  HENT: ear canals and TM's normal, nose and mouth without ulcers or lesions  NECK: no adenopathy, no asymmetry, masses, or scars  RESP: lungs clear to auscultation - no rales, rhonchi or wheezes  CV: regular rate and rhythm, normal S1 S2, no S3 or S4, no murmur, click or rub, no peripheral edema  MS: no gross musculoskeletal defects noted, " "no edema  SKIN: no suspicious lesions or rashes  NEURO: Normal strength and tone, mentation intact and speech normal  PSYCH: mentation appears normal, affect normal/bright    No results for input(s): \"HGB\", \"PLT\", \"INR\", \"NA\", \"POTASSIUM\", \"CR\", \"A1C\" in the last 8760 hours.     Diagnostics  Labs pending at this time.  Results will be reviewed when available.   No EKG required, no history of coronary heart disease, significant arrhythmia, peripheral arterial disease or other structural heart disease.    Revised Cardiac Risk Index (RCRI)  The patient has the following serious cardiovascular risks for perioperative complications:   - No serious cardiac risks = 0 points     RCRI Interpretation: 0 points: Class I (very low risk - 0.4% complication rate)         Signed Electronically by: Dennise Cerda DO  A copy of this evaluation report is provided to the requesting physician.         "

## 2025-02-18 NOTE — LETTER
February 20, 2025      Lore Oleary  [address]  [facility] [address]          Dear Lore,     I reviewed your results and they show that your calcium level is quite high and I recommend you see Endocrinology for this.  In the past, your high calcium was due to elevated parathyroid hormone.  This can cause her osteoporosis and cause other problems as well.  I placed that referral and they will call you to schedule an appointment.  I hope surgery went well.  Please also schedule an appointment to see me again soon so we can repeat labs and do further evaluation as well.       Resulted Orders   Basic metabolic panel  (Ca, Cl, CO2, Creat, Gluc, K, Na, BUN)   Result Value Ref Range    Sodium 136 135 - 145 mmol/L    Potassium 4.5 3.4 - 5.3 mmol/L    Chloride 96 (L) 98 - 107 mmol/L    Carbon Dioxide (CO2) 23 22 - 29 mmol/L    Anion Gap 17 (H) 7 - 15 mmol/L    Urea Nitrogen 13.3 8.0 - 23.0 mg/dL    Creatinine 0.82 0.51 - 0.95 mg/dL    GFR Estimate 77 >60 mL/min/1.73m2      Comment:      eGFR calculated using 2021 CKD-EPI equation.    Calcium 13.3 (H) 8.8 - 10.4 mg/dL    Glucose 82 70 - 99 mg/dL   Hemoglobin   Result Value Ref Range    Hemoglobin 12.8 11.7 - 15.7 g/dL     Thank you,     Dennise Cerda, DO   Internal Medicine - Pediatrics Physician   [facility]

## 2025-02-19 ENCOUNTER — TELEPHONE (OUTPATIENT)
Dept: FAMILY MEDICINE | Facility: CLINIC | Age: 70
End: 2025-02-19
Payer: COMMERCIAL

## 2025-02-19 NOTE — TELEPHONE ENCOUNTER
Writer called patient and relayed message from Dr. Cerda regarding referral for Endocrinology. Informed patient that they will be calling to schedule.   Patient stated that surgery went well and will be expecting a call from Endocrinology.     OSVALDO Gatica RN  [facility]    ----- Message from Dennise Cerda sent at 2/19/2025  4:53 PM CST -----  RN team- patient just had surgery today- can you please call and let her know that her calcium level is quite high and I recommend she see Endocrinology because in the past this was due to elevated parathyroid hormone?  This can cause her osteoporosis and cause other problems as well.  I placed that referral and they will call her to schedule an appointment.    Primary care team: Could you please send the patient the following results letter?  Thank you!    Dear Lore,    I reviewed your results and they show that your calcium level is quite high and I recommend you see Endocrinology for this.  In the past, your high calcium was due to elevated parathyroid hormone.  This can cause her osteoporosis and cause other problems as well.  I placed that referral and they will call you to schedule an appointment.  I hope surgery went well.  Please also schedule an appointment to see me again soon so we can repeat labs and do further evaluation as well.    Thank you,    Dennise Cerda, DO  Internal Medicine - Pediatrics Physician  [facility]

## 2025-02-24 ENCOUNTER — TELEPHONE (OUTPATIENT)
Dept: ENDOCRINOLOGY | Facility: CLINIC | Age: 70
End: 2025-02-24
Payer: COMMERCIAL

## 2025-02-24 NOTE — TELEPHONE ENCOUNTER
LVMx1 for the patient to call back and schedule the following:    Appointment type: NEW ENDOCRINE  Provider: Ines Lyons MD or any who sees hyperparathyroid  Return date: next available, [place] and on call OK  Specialty phone number: [phone]  Additional appointment(s) needed: N/A  Additonal Notes: LVMx1, no MyC    Patsy Hale, RN  [facility]  Please help schedule urgent on-call within 1 week.    Available appts:  [place] 02/24 [place] person [facility]  Ana Maria 02/26 virtual [facility]  Jc 03/18 virtual on call  Jc 03/21 virtual on call    Please note that the above appointment(s) will require manual scheduling as they are marked as CASTILLO and will not appear using auto search. Do not schedule the patient if another patient has already been scheduled in the requested appointment slot.     Ernestine Ulloa on 2/24/2025 at 8:41 AM

## 2025-02-26 NOTE — TELEPHONE ENCOUNTER
Left Voicemail (2nd Attempt), Letter Sent for the patient to call back and schedule the following:     Appointment type: NEW ENDOCRINE  Provider: Ines Lyons MD or any who sees hyperparathyroid  Return date: next available, [place] and on call OK  Specialty phone number: [phone]  Additional appointment(s) needed: N/A  Additonal Notes: LVMx2, no MyC, letter sent     Patsy Hale RN  [facility]  Please help schedule urgent on-call within 1 week.     Available appts:  [employer] 03/18 virtual on call  [facility] [employer] 03/21 virtual on call  [facility] 03/25 virtual on call  [facility] 03/26 virtual on call     Please note that the above appointment(s) will require manual scheduling as they are marked as CASTILLO and will not appear using auto search. Do not schedule the patient if another patient has already been scheduled in the requested appointment slot.         Ernestine Ulloa on 2/26/2025 at 9:20 AM

## 2025-05-16 ENCOUNTER — TELEPHONE (OUTPATIENT)
Dept: FAMILY MEDICINE | Facility: CLINIC | Age: 70
End: 2025-05-16
Payer: COMMERCIAL

## 2025-05-16 DIAGNOSIS — Z78.0 ENCOUNTER FOR OSTEOPOROSIS SCREENING IN ASYMPTOMATIC POSTMENOPAUSAL PATIENT: Primary | ICD-10-CM

## 2025-05-16 DIAGNOSIS — Z13.820 ENCOUNTER FOR OSTEOPOROSIS SCREENING IN ASYMPTOMATIC POSTMENOPAUSAL PATIENT: Primary | ICD-10-CM

## 2025-05-16 NOTE — TELEPHONE ENCOUNTER
Absolutely yes!!  Order placed.    Team- can you please let patient know I ordered a DEXA scan to evaluate for osteoporosis based on her fracture?    Lina    ----- Message from Jeaneth Landaverde sent at 5/16/2025 11:24 AM CDT -----  Regarding: DEXA order?  Hi Dr. Cerda,I am a clinical pharmacist that works in our value-based care team and Lore was identified due to her fracture in Feb. As part of the bone health management post-fracture, it is recommended patients either have a bone density screening to re-evaluate current state or initiate prescription therapy. I see you have referred her to endo and they are working to get her in sooner, but currently not scheduled until October. Would you be willing to put in a DEXA order now prior to her Endocrinology appointment? If there is any concern with managing the results of the bone density, a referral to MTM or endocrinology could be made.Thanks for considering and let me know if you would like me to assist in ordering or communicating with the patient.Jeaneth Landaverde, Pharm.D, BCACPMedication Therapy Management [phone]

## 2025-07-10 ENCOUNTER — OFFICE VISIT (OUTPATIENT)
Dept: URGENT CARE | Facility: URGENT CARE | Age: 70
End: 2025-07-10
Payer: COMMERCIAL

## 2025-07-10 VITALS
BODY MASS INDEX: 16.12 KG/M2 | DIASTOLIC BLOOD PRESSURE: 67 MMHG | TEMPERATURE: 97.1 F | HEART RATE: 59 BPM | WEIGHT: 91 LBS | OXYGEN SATURATION: 100 % | SYSTOLIC BLOOD PRESSURE: 105 MMHG | HEIGHT: 63 IN | RESPIRATION RATE: 16 BRPM

## 2025-07-10 DIAGNOSIS — E21.0 PRIMARY HYPERPARATHYROIDISM: ICD-10-CM

## 2025-07-10 DIAGNOSIS — R17 JAUNDICE: ICD-10-CM

## 2025-07-10 DIAGNOSIS — R10.11 RIGHT UPPER QUADRANT PAIN: Primary | ICD-10-CM

## 2025-07-10 DIAGNOSIS — Z85.828 HISTORY OF BASAL CELL CARCINOMA: ICD-10-CM

## 2025-07-10 DIAGNOSIS — R53.81 MALAISE: ICD-10-CM

## 2025-07-10 NOTE — PROGRESS NOTES
Assessment & Plan      Diagnosis Comments   1. Right upper quadrant pain        2. Malaise        3. Jaundice        4. History of basal cell carcinoma        5. Primary hyperparathyroidism        Due to patient history has many past medical history concerning potential hepatic dysfunction, cancer related risks as well.  Will patient be seen through the emergency department she is here with her sister who drove her states that she will bring her to [facility] emergency department.    We discussed concerns related to hepatic function possibly being causing her symptoms of above and related jaundice.    I also discussed this with provider at our acute diagnostic service center who also recommend patient be seen through the emergency department for further evaluation.    We discussed these concerns and would recommend presenting to the emergency department now would recommend staying on clear liquids only at this time.  Patient and her sister verbalized understanding agreement this plan.    SAIGE Flores [facility] ANGELA Torrez is a 70 year old female who presents to clinic today for the following health issues:  Chief Complaint   Patient presents with    Urgent Care     Pt states for the past week she has been dizzy and tired,  loss of appetite.She does appear to be yellow in the face and eyes.         7/10/2025    12:01 PM   Additional Questions   Roomed by Helena HUTTON    Patient presents to clinic with symptoms of overall malaise, dizziness, lightheadedness states she has not been feeling well for several weeks, decreased appetite.  Denies bowel changes nausea vomiting.  States normal urination.  Patient has a history of hyperparathyroid hormone level, elevated calcium levels, states she drinks about 1 beer per day, does not appear quite jaundiced today.  Has a history of basal cell carcinoma and had to follow dermatology for this but has not seen  "dermatology for several years.    Review of patient's epic chart patient was referred to endocrinology at 1 point in time but did not follow-up with this plan this was in 2021.      Review of Systems  Constitutional, HEENT, cardiovascular, pulmonary, gi and gu systems are negative, except as otherwise noted.      Objective    /67   Pulse 59   Temp 97.1  F (36.2  C)   Resp 16   Ht 1.6 m (5' 3\")   Wt 41.3 kg (91 lb)   SpO2 100%   BMI 16.12 kg/m    Physical Exam   GENERAL: alert, no distress, frail, fatigued, and jaundice  EYES: Eyes grossly normal to inspection and conjunctiva/corneas- jaundice  HENT: ear canals and TM's normal, nose and mouth without ulcers or lesions  NECK: no adenopathy, no asymmetry, masses, or scars  RESP: lungs clear to auscultation - no rales, rhonchi or wheezes  CV: regular rate and rhythm, normal S1 S2, no S3 or S4, no murmur, click or rub, no peripheral edema  ABDOMEN: tenderness RUQ, no organomegaly or masses, liver span normal to percussion, bowel sounds normal, no palpable or pulsatile masses, umbilicus normal, no bruits heard, and no palpable renal abnormalities   MS: no gross musculoskeletal defects noted, no edema  SKIN: no suspicious lesions or rashes  PSYCH: mentation appears normal, affect normal/bright          "

## 2025-07-10 NOTE — PROGRESS NOTES
Urgent Care Clinic Visit    Chief Complaint   Patient presents with    Urgent Care     Pt states for the past week she has been dizzy and tired,  loss of appetite.She does appear to be yellow in the face and eyes.               7/10/2025    12:01 PM   Additional Questions   Roomed by Helena

## (undated) RX ORDER — PROPARACAINE HYDROCHLORIDE 5 MG/ML
SOLUTION/ DROPS OPHTHALMIC
Status: DISPENSED
Start: 2021-02-08

## (undated) RX ORDER — ERYTHROMYCIN 5 MG/G
OINTMENT OPHTHALMIC
Status: DISPENSED
Start: 2021-02-08

## (undated) RX ORDER — ACETAMINOPHEN 500 MG
TABLET ORAL
Status: DISPENSED
Start: 2021-02-08